# Patient Record
Sex: FEMALE | Race: BLACK OR AFRICAN AMERICAN | NOT HISPANIC OR LATINO | Employment: FULL TIME | URBAN - METROPOLITAN AREA
[De-identification: names, ages, dates, MRNs, and addresses within clinical notes are randomized per-mention and may not be internally consistent; named-entity substitution may affect disease eponyms.]

---

## 2022-04-10 ENCOUNTER — HISTORICAL (OUTPATIENT)
Dept: ADMINISTRATIVE | Facility: HOSPITAL | Age: 27
End: 2022-04-10

## 2022-04-29 VITALS
WEIGHT: 255.75 LBS | BODY MASS INDEX: 38.76 KG/M2 | SYSTOLIC BLOOD PRESSURE: 154 MMHG | HEIGHT: 68 IN | DIASTOLIC BLOOD PRESSURE: 109 MMHG | OXYGEN SATURATION: 100 %

## 2023-11-10 ENCOUNTER — HOSPITAL ENCOUNTER (INPATIENT)
Facility: HOSPITAL | Age: 28
LOS: 10 days | Discharge: LEFT BEFORE DISCHARGE | DRG: 062 | End: 2023-11-20
Attending: EMERGENCY MEDICINE | Admitting: INTERNAL MEDICINE
Payer: MEDICAID

## 2023-11-10 DIAGNOSIS — I63.9 STROKE: ICD-10-CM

## 2023-11-10 DIAGNOSIS — E87.6 HYPOKALEMIA: ICD-10-CM

## 2023-11-10 DIAGNOSIS — I63.89 CEREBROVASCULAR ACCIDENT (CVA) DUE TO OTHER MECHANISM: Primary | ICD-10-CM

## 2023-11-10 DIAGNOSIS — R29.818 ACUTE FOCAL NEUROLOGICAL DEFICIT: ICD-10-CM

## 2023-11-10 DIAGNOSIS — I16.1 HYPERTENSIVE EMERGENCY: ICD-10-CM

## 2023-11-10 LAB
ALBUMIN SERPL-MCNC: 3.1 G/DL (ref 3.5–5)
ALBUMIN/GLOB SERPL: 1.1 RATIO (ref 1.1–2)
ALP SERPL-CCNC: 68 UNIT/L (ref 40–150)
ALT SERPL-CCNC: 16 UNIT/L (ref 0–55)
ANION GAP SERPL CALC-SCNC: 20 MMOL/L (ref 8–16)
AST SERPL-CCNC: 14 UNIT/L (ref 5–34)
BASOPHILS # BLD AUTO: 0.01 X10(3)/MCL
BASOPHILS NFR BLD AUTO: 0.2 %
BILIRUB SERPL-MCNC: 0.3 MG/DL
BUN SERPL-MCNC: 11 MG/DL (ref 6–30)
BUN SERPL-MCNC: 12.2 MG/DL (ref 7–18.7)
CALCIUM SERPL-MCNC: 7.9 MG/DL (ref 8.4–10.2)
CHLORIDE SERPL-SCNC: 108 MMOL/L (ref 95–110)
CHLORIDE SERPL-SCNC: 113 MMOL/L (ref 98–107)
CHOLEST SERPL-MCNC: 101 MG/DL
CHOLEST/HDLC SERPL: 3 {RATIO} (ref 0–5)
CO2 SERPL-SCNC: 21 MMOL/L (ref 22–29)
CREAT SERPL-MCNC: 0.7 MG/DL (ref 0.5–1.4)
CREAT SERPL-MCNC: 0.73 MG/DL (ref 0.55–1.02)
EOSINOPHIL # BLD AUTO: 0.2 X10(3)/MCL (ref 0–0.9)
EOSINOPHIL NFR BLD AUTO: 3.2 %
ERYTHROCYTE [DISTWIDTH] IN BLOOD BY AUTOMATED COUNT: 15.1 % (ref 11.5–17)
GFR SERPLBLD CREATININE-BSD FMLA CKD-EPI: >60 MLS/MIN/1.73/M2
GLOBULIN SER-MCNC: 2.9 GM/DL (ref 2.4–3.5)
GLUCOSE SERPL-MCNC: 82 MG/DL (ref 74–100)
GLUCOSE SERPL-MCNC: 85 MG/DL (ref 70–110)
HCT VFR BLD AUTO: 27.3 % (ref 37–47)
HCT VFR BLD CALC: 29 %PCV (ref 36–54)
HDLC SERPL-MCNC: 33 MG/DL (ref 35–60)
HGB BLD-MCNC: 10 G/DL
HGB BLD-MCNC: 8.7 G/DL (ref 12–16)
IMM GRANULOCYTES # BLD AUTO: 0.02 X10(3)/MCL (ref 0–0.04)
IMM GRANULOCYTES NFR BLD AUTO: 0.3 %
INR PPP: 1.2
LDLC SERPL CALC-MCNC: 52 MG/DL (ref 50–140)
LYMPHOCYTES # BLD AUTO: 2.2 X10(3)/MCL (ref 0.6–4.6)
LYMPHOCYTES NFR BLD AUTO: 35 %
MAGNESIUM SERPL-MCNC: 1.6 MG/DL (ref 1.6–2.6)
MCH RBC QN AUTO: 24.6 PG (ref 27–31)
MCHC RBC AUTO-ENTMCNC: 31.9 G/DL (ref 33–36)
MCV RBC AUTO: 77.1 FL (ref 80–94)
MONOCYTES # BLD AUTO: 0.4 X10(3)/MCL (ref 0.1–1.3)
MONOCYTES NFR BLD AUTO: 6.4 %
NEUTROPHILS # BLD AUTO: 3.45 X10(3)/MCL (ref 2.1–9.2)
NEUTROPHILS NFR BLD AUTO: 54.9 %
NRBC BLD AUTO-RTO: 0 %
PLATELET # BLD AUTO: 212 X10(3)/MCL (ref 130–400)
PMV BLD AUTO: 10.1 FL (ref 7.4–10.4)
POC IONIZED CALCIUM: 1.13 MMOL/L (ref 1.06–1.42)
POC PTINR: 1.2 (ref 0.9–1.2)
POC PTWBT: 14.5 SEC (ref 9.7–14.3)
POC TCO2 (MEASURED): 20 MMOL/L (ref 23–27)
POTASSIUM BLD-SCNC: 2.5 MMOL/L (ref 3.5–5.1)
POTASSIUM SERPL-SCNC: 2.6 MMOL/L (ref 3.5–5.1)
PROT SERPL-MCNC: 6 GM/DL (ref 6.4–8.3)
PROTHROMBIN TIME: 14.6 SECONDS (ref 12.5–14.5)
RBC # BLD AUTO: 3.54 X10(6)/MCL (ref 4.2–5.4)
SAMPLE: ABNORMAL
SAMPLE: ABNORMAL
SODIUM BLD-SCNC: 144 MMOL/L (ref 136–145)
SODIUM SERPL-SCNC: 141 MMOL/L (ref 136–145)
TRIGL SERPL-MCNC: 80 MG/DL (ref 37–140)
TSH SERPL-ACNC: 0.61 UIU/ML (ref 0.35–4.94)
VLDLC SERPL CALC-MCNC: 16 MG/DL
WBC # SPEC AUTO: 6.28 X10(3)/MCL (ref 4.5–11.5)

## 2023-11-10 PROCEDURE — A4216 STERILE WATER/SALINE, 10 ML: HCPCS | Performed by: EMERGENCY MEDICINE

## 2023-11-10 PROCEDURE — 80061 LIPID PANEL: CPT | Performed by: EMERGENCY MEDICINE

## 2023-11-10 PROCEDURE — 80053 COMPREHEN METABOLIC PANEL: CPT | Performed by: EMERGENCY MEDICINE

## 2023-11-10 PROCEDURE — 96375 TX/PRO/DX INJ NEW DRUG ADDON: CPT

## 2023-11-10 PROCEDURE — 80047 BASIC METABLC PNL IONIZED CA: CPT

## 2023-11-10 PROCEDURE — 83735 ASSAY OF MAGNESIUM: CPT | Performed by: EMERGENCY MEDICINE

## 2023-11-10 PROCEDURE — 25000003 PHARM REV CODE 250

## 2023-11-10 PROCEDURE — 93005 ELECTROCARDIOGRAM TRACING: CPT

## 2023-11-10 PROCEDURE — 99291 CRITICAL CARE FIRST HOUR: CPT

## 2023-11-10 PROCEDURE — 85610 PROTHROMBIN TIME: CPT | Performed by: EMERGENCY MEDICINE

## 2023-11-10 PROCEDURE — 96365 THER/PROPH/DIAG IV INF INIT: CPT

## 2023-11-10 PROCEDURE — 93010 ELECTROCARDIOGRAM REPORT: CPT | Mod: ,,, | Performed by: INTERNAL MEDICINE

## 2023-11-10 PROCEDURE — 63600175 PHARM REV CODE 636 W HCPCS: Performed by: EMERGENCY MEDICINE

## 2023-11-10 PROCEDURE — 25500020 PHARM REV CODE 255: Performed by: EMERGENCY MEDICINE

## 2023-11-10 PROCEDURE — 11000001 HC ACUTE MED/SURG PRIVATE ROOM

## 2023-11-10 PROCEDURE — 93010 EKG 12-LEAD: ICD-10-PCS | Mod: ,,, | Performed by: INTERNAL MEDICINE

## 2023-11-10 PROCEDURE — 84443 ASSAY THYROID STIM HORMONE: CPT | Performed by: EMERGENCY MEDICINE

## 2023-11-10 PROCEDURE — 85025 COMPLETE CBC W/AUTO DIFF WBC: CPT | Performed by: EMERGENCY MEDICINE

## 2023-11-10 PROCEDURE — 25000003 PHARM REV CODE 250: Performed by: EMERGENCY MEDICINE

## 2023-11-10 RX ORDER — NICARDIPINE HYDROCHLORIDE 0.2 MG/ML
0-15 INJECTION INTRAVENOUS CONTINUOUS
Status: DISCONTINUED | OUTPATIENT
Start: 2023-11-10 | End: 2023-11-12

## 2023-11-10 RX ORDER — NICARDIPINE HYDROCHLORIDE 0.2 MG/ML
INJECTION INTRAVENOUS
Status: COMPLETED
Start: 2023-11-10 | End: 2023-11-10

## 2023-11-10 RX ORDER — POTASSIUM CHLORIDE 14.9 MG/ML
40 INJECTION INTRAVENOUS
Status: COMPLETED | OUTPATIENT
Start: 2023-11-10 | End: 2023-11-11

## 2023-11-10 RX ORDER — SODIUM CHLORIDE 0.9 % (FLUSH) 0.9 %
10 SYRINGE (ML) INJECTION ONCE
Status: COMPLETED | OUTPATIENT
Start: 2023-11-10 | End: 2023-11-10

## 2023-11-10 RX ADMIN — NICARDIPINE HYDROCHLORIDE 2.5 MG/HR: 0.2 INJECTION, SOLUTION INTRAVENOUS at 09:11

## 2023-11-10 RX ADMIN — Medication 25 MG: at 10:11

## 2023-11-10 RX ADMIN — POTASSIUM CHLORIDE 40 MEQ: 14.9 INJECTION, SOLUTION INTRAVENOUS at 11:11

## 2023-11-10 RX ADMIN — SODIUM CHLORIDE, PRESERVATIVE FREE 10 ML: 5 INJECTION INTRAVENOUS at 10:11

## 2023-11-10 RX ADMIN — IOPAMIDOL 50 ML: 755 INJECTION, SOLUTION INTRAVENOUS at 09:11

## 2023-11-10 NOTE — Clinical Note
Diagnosis: Acute focal neurological deficit [558691]   Admitting Provider:: ROSANA MICHAELS [[id number]]   Admit to which facility:: [facility] [[id number]]   Reason for IP Medical Treatment  (Clinical interventions that can only be accomplished in the IP setting? ) :: cva   I certify that Inpatient services for greater than or equal to 2 midnights are medically necessary:: Yes   Plans for Post-Acute care--if anticipated (pick the single best option):: A. No post acute care anticipated at this time

## 2023-11-11 PROBLEM — I63.9 STROKE: Status: ACTIVE | Noted: 2023-11-11

## 2023-11-11 LAB
ALBUMIN SERPL-MCNC: 3.6 G/DL (ref 3.5–5)
ALBUMIN/GLOB SERPL: 0.9 RATIO (ref 1.1–2)
ALP SERPL-CCNC: 72 UNIT/L (ref 40–150)
ALT SERPL-CCNC: 16 UNIT/L (ref 0–55)
AST SERPL-CCNC: 19 UNIT/L (ref 5–34)
AV INDEX (PROSTH): 0.8
AV MEAN GRADIENT: 3 MMHG
AV PEAK GRADIENT: 6 MMHG
AV VALVE AREA BY VELOCITY RATIO: 4.06 CM²
AV VALVE AREA: 3.91 CM²
AV VELOCITY RATIO: 0.83
BASOPHILS # BLD AUTO: 0.02 X10(3)/MCL
BASOPHILS NFR BLD AUTO: 0.3 %
BILIRUB SERPL-MCNC: 0.5 MG/DL
BSA FOR ECHO PROCEDURE: 2.29 M2
BUN SERPL-MCNC: 10.4 MG/DL (ref 7–18.7)
CALCIUM SERPL-MCNC: 8.9 MG/DL (ref 8.4–10.2)
CHLORIDE SERPL-SCNC: 108 MMOL/L (ref 98–107)
CO2 SERPL-SCNC: 21 MMOL/L (ref 22–29)
CREAT SERPL-MCNC: 0.76 MG/DL (ref 0.55–1.02)
CRP SERPL HS-MCNC: 9.46 MG/L
CV ECHO LV RWT: 0.52 CM
DOP CALC AO PEAK VEL: 1.27 M/S
DOP CALC AO VTI: 23.7 CM
DOP CALC LVOT AREA: 4.9 CM2
DOP CALC LVOT DIAMETER: 2.5 CM
DOP CALC LVOT PEAK VEL: 1.05 M/S
DOP CALC LVOT STROKE VOLUME: 92.73 CM3
DOP CALC MV VTI: 26.7 CM
DOP CALCLVOT PEAK VEL VTI: 18.9 CM
E WAVE DECELERATION TIME: 195 MSEC
E/A RATIO: 1.03
E/E' RATIO: 8.63 M/S
ECHO LV POSTERIOR WALL: 1.1 CM (ref 0.6–1.1)
EOSINOPHIL # BLD AUTO: 0.26 X10(3)/MCL (ref 0–0.9)
EOSINOPHIL NFR BLD AUTO: 3.7 %
ERYTHROCYTE [DISTWIDTH] IN BLOOD BY AUTOMATED COUNT: 15.1 % (ref 11.5–17)
ERYTHROCYTE [SEDIMENTATION RATE] IN BLOOD: 57 MM/HR (ref 0–20)
EST. AVERAGE GLUCOSE BLD GHB EST-MCNC: 102.5 MG/DL
FRACTIONAL SHORTENING: 31 % (ref 28–44)
GFR SERPLBLD CREATININE-BSD FMLA CKD-EPI: >60 MLS/MIN/1.73/M2
GLOBULIN SER-MCNC: 4.2 GM/DL (ref 2.4–3.5)
GLUCOSE SERPL-MCNC: 84 MG/DL (ref 74–100)
HBA1C MFR BLD: 5.2 %
HCT VFR BLD AUTO: 33.7 % (ref 37–47)
HGB BLD-MCNC: 10.9 G/DL (ref 12–16)
IMM GRANULOCYTES # BLD AUTO: 0.02 X10(3)/MCL (ref 0–0.04)
IMM GRANULOCYTES NFR BLD AUTO: 0.3 %
INTERVENTRICULAR SEPTUM: 1.5 CM (ref 0.6–1.1)
LEFT ATRIUM SIZE: 3.6 CM
LEFT INTERNAL DIMENSION IN SYSTOLE: 2.9 CM (ref 2.1–4)
LEFT VENTRICLE DIASTOLIC VOLUME INDEX: 35.73 ML/M2
LEFT VENTRICLE DIASTOLIC VOLUME: 78.6 ML
LEFT VENTRICLE MASS INDEX: 91 G/M2
LEFT VENTRICLE SYSTOLIC VOLUME INDEX: 14.6 ML/M2
LEFT VENTRICLE SYSTOLIC VOLUME: 32.2 ML
LEFT VENTRICULAR INTERNAL DIMENSION IN DIASTOLE: 4.2 CM (ref 3.5–6)
LEFT VENTRICULAR MASS: 200.57 G
LV LATERAL E/E' RATIO: 7.45 M/S
LV SEPTAL E/E' RATIO: 10.25 M/S
LVOT MG: 2 MMHG
LVOT MV: 0.67 CM/S
LYMPHOCYTES # BLD AUTO: 2.18 X10(3)/MCL (ref 0.6–4.6)
LYMPHOCYTES NFR BLD AUTO: 31.2 %
MCH RBC QN AUTO: 24.9 PG (ref 27–31)
MCHC RBC AUTO-ENTMCNC: 32.3 G/DL (ref 33–36)
MCV RBC AUTO: 76.9 FL (ref 80–94)
MONOCYTES # BLD AUTO: 0.47 X10(3)/MCL (ref 0.1–1.3)
MONOCYTES NFR BLD AUTO: 6.7 %
MV MEAN GRADIENT: 3 MMHG
MV PEAK A VEL: 0.8 M/S
MV PEAK E VEL: 0.82 M/S
MV PEAK GRADIENT: 5 MMHG
MV STENOSIS PRESSURE HALF TIME: 49 MS
MV VALVE AREA BY CONTINUITY EQUATION: 3.47 CM2
MV VALVE AREA P 1/2 METHOD: 4.49 CM2
NEUTROPHILS # BLD AUTO: 4.04 X10(3)/MCL (ref 2.1–9.2)
NEUTROPHILS NFR BLD AUTO: 57.8 %
NRBC BLD AUTO-RTO: 0 %
OHS LV EJECTION FRACTION SIMPSONS BIPLANE MOD: 51 %
PISA TR MAX VEL: 1.51 M/S
PLATELET # BLD AUTO: 227 X10(3)/MCL (ref 130–400)
PLATELETS.RETICULATED NFR BLD AUTO: 2.5 % (ref 0.9–11.2)
PMV BLD AUTO: 10.5 FL (ref 7.4–10.4)
POTASSIUM SERPL-SCNC: 3.8 MMOL/L (ref 3.5–5.1)
PROT SERPL-MCNC: 7.8 GM/DL (ref 6.4–8.3)
PV PEAK GRADIENT: 3 MMHG
PV PEAK VELOCITY: 0.87 M/S
RBC # BLD AUTO: 4.38 X10(6)/MCL (ref 4.2–5.4)
RIGHT VENTRICULAR END-DIASTOLIC DIMENSION: 3 CM
SODIUM SERPL-SCNC: 139 MMOL/L (ref 136–145)
TDI LATERAL: 0.11 M/S
TDI SEPTAL: 0.08 M/S
TDI: 0.1 M/S
TR MAX PG: 9 MMHG
TRICUSPID ANNULAR PLANE SYSTOLIC EXCURSION: 2.01 CM
WBC # SPEC AUTO: 6.99 X10(3)/MCL (ref 4.5–11.5)
Z-SCORE OF LEFT VENTRICULAR DIMENSION IN END DIASTOLE: -5.81
Z-SCORE OF LEFT VENTRICULAR DIMENSION IN END SYSTOLE: -3.59

## 2023-11-11 PROCEDURE — 25000003 PHARM REV CODE 250: Performed by: INTERNAL MEDICINE

## 2023-11-11 PROCEDURE — 25000003 PHARM REV CODE 250: Performed by: NURSE PRACTITIONER

## 2023-11-11 PROCEDURE — 20000000 HC ICU ROOM

## 2023-11-11 PROCEDURE — 25000003 PHARM REV CODE 250: Performed by: EMERGENCY MEDICINE

## 2023-11-11 PROCEDURE — 25000003 PHARM REV CODE 250: Performed by: STUDENT IN AN ORGANIZED HEALTH CARE EDUCATION/TRAINING PROGRAM

## 2023-11-11 PROCEDURE — 85025 COMPLETE CBC W/AUTO DIFF WBC: CPT | Performed by: STUDENT IN AN ORGANIZED HEALTH CARE EDUCATION/TRAINING PROGRAM

## 2023-11-11 PROCEDURE — 99223 1ST HOSP IP/OBS HIGH 75: CPT | Mod: ,,, | Performed by: PSYCHIATRY & NEUROLOGY

## 2023-11-11 PROCEDURE — 99223 PR INITIAL HOSPITAL CARE,LEVL III: ICD-10-PCS | Mod: ,,, | Performed by: PSYCHIATRY & NEUROLOGY

## 2023-11-11 PROCEDURE — 86141 C-REACTIVE PROTEIN HS: CPT | Performed by: NURSE PRACTITIONER

## 2023-11-11 PROCEDURE — 83036 HEMOGLOBIN GLYCOSYLATED A1C: CPT | Performed by: STUDENT IN AN ORGANIZED HEALTH CARE EDUCATION/TRAINING PROGRAM

## 2023-11-11 PROCEDURE — 85652 RBC SED RATE AUTOMATED: CPT | Performed by: NURSE PRACTITIONER

## 2023-11-11 PROCEDURE — 80053 COMPREHEN METABOLIC PANEL: CPT | Performed by: STUDENT IN AN ORGANIZED HEALTH CARE EDUCATION/TRAINING PROGRAM

## 2023-11-11 PROCEDURE — 92523 SPEECH SOUND LANG COMPREHEN: CPT

## 2023-11-11 RX ORDER — SODIUM CHLORIDE 9 MG/ML
INJECTION, SOLUTION INTRAVENOUS CONTINUOUS
Status: DISCONTINUED | OUTPATIENT
Start: 2023-11-11 | End: 2023-11-12

## 2023-11-11 RX ORDER — ACETAMINOPHEN 325 MG/1
650 TABLET ORAL EVERY 6 HOURS PRN
Status: DISCONTINUED | OUTPATIENT
Start: 2023-11-11 | End: 2023-11-20 | Stop reason: HOSPADM

## 2023-11-11 RX ORDER — LABETALOL HYDROCHLORIDE 5 MG/ML
10 INJECTION, SOLUTION INTRAVENOUS EVERY 4 HOURS PRN
Status: DISCONTINUED | OUTPATIENT
Start: 2023-11-11 | End: 2023-11-20 | Stop reason: HOSPADM

## 2023-11-11 RX ORDER — AMLODIPINE BESYLATE 5 MG/1
10 TABLET ORAL DAILY
Status: DISCONTINUED | OUTPATIENT
Start: 2023-11-11 | End: 2023-11-20 | Stop reason: HOSPADM

## 2023-11-11 RX ORDER — ACETAMINOPHEN 325 MG/1
650 TABLET ORAL ONCE
Status: COMPLETED | OUTPATIENT
Start: 2023-11-11 | End: 2023-11-11

## 2023-11-11 RX ORDER — MUPIROCIN 20 MG/G
OINTMENT TOPICAL 2 TIMES DAILY
Status: DISPENSED | OUTPATIENT
Start: 2023-11-11 | End: 2023-11-16

## 2023-11-11 RX ORDER — LABETALOL 100 MG/1
100 TABLET, FILM COATED ORAL EVERY 12 HOURS
Status: DISCONTINUED | OUTPATIENT
Start: 2023-11-11 | End: 2023-11-20 | Stop reason: HOSPADM

## 2023-11-11 RX ADMIN — MUPIROCIN: 20 OINTMENT TOPICAL at 08:11

## 2023-11-11 RX ADMIN — NICARDIPINE HYDROCHLORIDE 5 MG/HR: 0.2 INJECTION, SOLUTION INTRAVENOUS at 08:11

## 2023-11-11 RX ADMIN — SODIUM CHLORIDE: 9 INJECTION, SOLUTION INTRAVENOUS at 08:11

## 2023-11-11 RX ADMIN — AMLODIPINE BESYLATE 10 MG: 5 TABLET ORAL at 08:11

## 2023-11-11 RX ADMIN — MUPIROCIN: 20 OINTMENT TOPICAL at 09:11

## 2023-11-11 RX ADMIN — LABETALOL HYDROCHLORIDE 100 MG: 100 TABLET, FILM COATED ORAL at 09:11

## 2023-11-11 RX ADMIN — ACETAMINOPHEN 650 MG: 325 TABLET, FILM COATED ORAL at 11:11

## 2023-11-11 RX ADMIN — LABETALOL HYDROCHLORIDE 100 MG: 100 TABLET, FILM COATED ORAL at 08:11

## 2023-11-11 RX ADMIN — ACETAMINOPHEN 325MG 650 MG: 325 TABLET ORAL at 06:11

## 2023-11-11 RX ADMIN — POTASSIUM BICARBONATE 20 MEQ: 391 TABLET, EFFERVESCENT ORAL at 12:11

## 2023-11-11 NOTE — SUBJECTIVE & OBJECTIVE
History reviewed. No pertinent past medical history.    History reviewed. No pertinent surgical history.    Review of patient's allergies indicates:  Not on File      No current facility-administered medications on file prior to encounter.     No current outpatient medications on file prior to encounter.     Family History    None       Tobacco Use    Smoking status: Not on file    Smokeless tobacco: Not on file   Substance and Sexual Activity    Alcohol use: Not on file    Drug use: Not on file    Sexual activity: Not on file     Review of Systems   Neurological:  Positive for speech difficulty, weakness and headaches. Negative for dizziness, tremors, seizures, syncope, facial asymmetry, light-headedness and numbness.   All other systems reviewed and are negative.    Objective:     Vital Signs (Most Recent):  Temp: 98.4 °F (36.9 °C) (11/11/23 0215)  Pulse: 87 (11/11/23 0500)  Resp: 16 (11/11/23 0500)  BP: (!) 140/89 (11/11/23 0530)  SpO2: 96 % (11/11/23 0500) Vital Signs (24h Range):  Temp:  [98.1 °F (36.7 °C)-98.4 °F (36.9 °C)] 98.4 °F (36.9 °C)  Pulse:  [] 87  Resp:  [14-28] 16  SpO2:  [95 %-100 %] 96 %  BP: (127-198)/() 140/89     Weight: 111.1 kg (244 lb 14.9 oz)  Body mass index is 38.36 kg/m².     Physical Exam  Vitals reviewed.   Constitutional:       General: She is not in acute distress.     Appearance: She is not ill-appearing.   HENT:      Head: Normocephalic.   Eyes:      General: Vision grossly intact. No visual field deficit.     Extraocular Movements: Extraocular movements intact.      Pupils: Pupils are equal, round, and reactive to light.   Cardiovascular:      Rate and Rhythm: Normal rate.   Pulmonary:      Effort: Pulmonary effort is normal.   Skin:     General: Skin is warm and dry.      Capillary Refill: Capillary refill takes less than 2 seconds.   Neurological:      Mental Status: She is oriented to person, place, and time.      Cranial Nerves: No dysarthria or facial asymmetry.  "     Sensory: Sensation is intact.      Motor: No weakness or pronator drift.      Coordination: Coordination is intact.   Psychiatric:         Attention and Perception: Attention normal.         Mood and Affect: Mood and affect normal.         Speech: Speech normal.         Behavior: Behavior normal.        Significant Labs: Hemoglobin A1c:   Recent Labs   Lab 11/11/23  0613   HGBA1C 5.2     BMP:   Recent Labs   Lab 11/10/23  2135 11/11/23  0405    139   K 2.6* 3.8   CO2 21* 21*   BUN 12.2 10.4   CREATININE 0.73 0.76   CALCIUM 7.9* 8.9   MG 1.60  --      CBC:   Recent Labs   Lab 11/10/23  2132 11/10/23  2135 11/11/23  0405   WBC  --  6.28 6.99   HGB  --  8.7* 10.9*   HCT 29* 27.3* 33.7*   PLT  --  212 227     Inflammatory Markers: No results for input(s): "SEDRATE", "CRP", "PROCAL" in the last 48 hours.    Significant Imaging: I have reviewed all pertinent imaging results/findings within the past 24 hours.    "

## 2023-11-11 NOTE — PT/OT/SLP PROGRESS
Physical Therapy      Patient Name:  Trish Hale   MRN:  [id number]    Patient not seen today secondary to on 24 hour bedrest s/p TNK on 11/10 at 10:15 p.m. Will follow-up as schedule permits after bedrest is completed.

## 2023-11-11 NOTE — NURSING
Nurses Note -- 4 Eyes      11/11/2023   11:20 AM      Skin assessed during: Admit      [x] No Altered Skin Integrity Present    [x]Prevention Measures Documented      [] Yes- Altered Skin Integrity Present or Discovered   [] LDA Added if Not in Epic (Describe Wound)   [] New Altered Skin Integrity was Present on Admit and Documented in LDA   [] Wound Image Taken    Wound Care Consulted? No    Attending Nurse:  Huma Nguyen RN/Staff Member:  Juvenal Lamb RN

## 2023-11-11 NOTE — PT/OT/SLP PROGRESS
Occupational Therapy      Patient Name:  Trish Hale   MRN:  [id number]    OT consult received, however pt is s/p TNK on 11/10 at 10:15 p.m and is on 24 hr bedrest from that time per neuro recs. OT to f/u for initial eval as appropriate.    11/11/2023

## 2023-11-11 NOTE — CONSULTS
[facility] - 7th Floor ICU  Neurology  Consult Note    Patient Name: Trish Hale  MRN: [id number]  Admission Date: 11/10/2023  Hospital Length of Stay: 1 days  Code Status: No Order   Attending Provider: ROSANA Lugo MD   Consulting Provider: SASHA Gutierrez  Primary Care Physician: No, Primary Doctor  Principal Problem:<principal problem not specified>    Inpatient consult to Vascular (Stroke) Neurology  Consult performed by: Bri Perez FNP  Consult ordered by: Glenys Lenz MD         Subjective:     Chief Complaint:    Chief Complaint   Patient presents with    Cerebrovascular Accident     Pt presents with slurred speech, aphasia, R arm droop R weakness that started at 1830. [id number]         HPI:   28-year-old female with past medical history of HTN, presented to ED on 11/10 for reports of slurred speech and right-sided weakness.  Symptoms began at 6:30 p.m..  She reported similar episode in July of 2023 that resolved without intervention.  CTH showed no acute intracranial abnormality.  She was hypertensive in ED and started on Cardene infusion.  She was given TNK at 10:15 p.m..  She was admitted to ICU for post TNK protocol.  Neurology is following for stroke workup.       History reviewed. No pertinent past medical history.    History reviewed. No pertinent surgical history.    Review of patient's allergies indicates:  Not on File      No current facility-administered medications on file prior to encounter.     No current outpatient medications on file prior to encounter.     Family History    None       Tobacco Use    Smoking status: Not on file    Smokeless tobacco: Not on file   Substance and Sexual Activity    Alcohol use: Not on file    Drug use: Not on file    Sexual activity: Not on file     Review of Systems   Neurological:  Positive for speech difficulty, weakness and headaches. Negative for dizziness, tremors, seizures, syncope, facial asymmetry,  light-headedness and numbness.   All other systems reviewed and are negative.    Objective:     Vital Signs (Most Recent):  Temp: 98.4 °F (36.9 °C) (11/11/23 0215)  Pulse: 87 (11/11/23 0500)  Resp: 16 (11/11/23 0500)  BP: (!) 140/89 (11/11/23 0530)  SpO2: 96 % (11/11/23 0500) Vital Signs (24h Range):  Temp:  [98.1 °F (36.7 °C)-98.4 °F (36.9 °C)] 98.4 °F (36.9 °C)  Pulse:  [] 87  Resp:  [14-28] 16  SpO2:  [95 %-100 %] 96 %  BP: (127-198)/() 140/89     Weight: 111.1 kg (244 lb 14.9 oz)  Body mass index is 38.36 kg/m².     Physical Exam  Vitals reviewed.   Constitutional:       General: She is not in acute distress.     Appearance: She is not ill-appearing.   HENT:      Head: Normocephalic.   Eyes:      General: Vision grossly intact. No visual field deficit.     Extraocular Movements: Extraocular movements intact.      Pupils: Pupils are equal, round, and reactive to light.   Cardiovascular:      Rate and Rhythm: Normal rate.   Pulmonary:      Effort: Pulmonary effort is normal.   Skin:     General: Skin is warm and dry.      Capillary Refill: Capillary refill takes less than 2 seconds.   Neurological:      Mental Status: She is oriented to person, place, and time.      Cranial Nerves: No dysarthria or facial asymmetry.      Sensory: Sensation is intact.      Motor: No weakness or pronator drift.      Coordination: Coordination is intact.   Psychiatric:         Attention and Perception: Attention normal.         Mood and Affect: Mood and affect normal.         Speech: Speech normal.         Behavior: Behavior normal.        Significant Labs: Hemoglobin A1c:   Recent Labs   Lab 11/11/23  0613   HGBA1C 5.2     BMP:   Recent Labs   Lab 11/10/23  2135 11/11/23  0405    139   K 2.6* 3.8   CO2 21* 21*   BUN 12.2 10.4   CREATININE 0.73 0.76   CALCIUM 7.9* 8.9   MG 1.60  --      CBC:   Recent Labs   Lab 11/10/23  2132 11/10/23  2135 11/11/23  0405   WBC  --  6.28 6.99   HGB  --  8.7* 10.9*   HCT 29* 27.3*  "33.7*   PLT  --  212 227     Inflammatory Markers: No results for input(s): "SEDRATE", "CRP", "PROCAL" in the last 48 hours.    Significant Imaging: I have reviewed all pertinent imaging results/findings within the past 24 hours.    Assessment and Plan:     Stroke  Stroke workup  - presented with slurred speech and right-sided weakness  - Stroke RF:  HTN  - Intervention: s/p TNK on 11/10 at 10:15 p.m..  No LVO on CTA.    - Etiology:  TBD    Stroke workup:  -CTh:  No acute intracranial abnormality.  -CTA h/n:  No acute abnormality. No high-grade stenosis or major vessel occlusion.  -LDL: 52  -A1c:  5.2  -TSH:  0.612  -not on antiplatelet, anticoagulation, or statin therapy home      Plan:  -permissive HTN for now ... SBP less than 180  -q1hr neuro checks  -STAT CTh for any HA or neuro change  -HOB flat, Bedrest, NPO x24 hours post TNK  -repeat CTh after 24 hours to determine if antiplatelet therapy can be initiated         VTE Risk Mitigation (From admission, onward)      None            Thank you for your consult.  Further recommendations to follow by MD.     Bri Perez, DILIAP  Neurology  [facility] - 7th Floor ICU  "

## 2023-11-11 NOTE — HPI
28-year-old female with past medical history of HTN, presented to ED on 11/10 for reports of slurred speech and right-sided weakness.  Symptoms began at 6:30 p.m..  She reported similar episode in July of 2023 that resolved without intervention.  CTH showed no acute intracranial abnormality.  She was hypertensive in ED and started on Cardene infusion.  She was given TNK at 10:15 p.m..  She was admitted to ICU for post TNK protocol.  Neurology is following for stroke workup.

## 2023-11-11 NOTE — H&P
[facility] - Emergency Dept  Pulmonary Critical Care Note    Patient Name: Trish Hale  MRN: [id number]  Admission Date: 11/10/2023  Hospital Length of Stay: 1 days  Code Status: No Order  Attending Provider: ROSANA Lugo MD  Primary Care Provider: Angle, Primary Doctor     Subjective:     HPI:   Trish Hale is a 28 y.o. female with PMH of HTN, who presented to the ED on 11/10/2023 with CC of slurred speech and right-sided weakness accompanied by numbness that started at 1830 on 11/10/2023. She states that she had similar episode in July 2023 that resolved without any intervention. Questionable compliance with antihypertensive medications. Admitted to the ICU for close monitoring s/p TNK.     Hospital Course/Significant events:  11/10/2023 - Admitted to ICU s/p TNK    24 Hour Interval History:  Pending    History reviewed. No pertinent past medical history.    History reviewed. No pertinent surgical history.    Social History     Socioeconomic History    Marital status: Single       No current outpatient medications  Current Inpatient Medications   potassium chloride in water  40 mEq Intravenous ED 1 Time     Current Intravenous Infusions   nicardipine 8 mg/hr (11/10/23 2212)     Review of Systems   Neurological:  Positive for weakness.        Slurred speech        Objective:   No intake or output data in the 24 hours ending 11/11/23 0103  Vital Signs (Most Recent):  Temp: 98.1 °F (36.7 °C) (11/10/23 2126)  Pulse: 71 (11/10/23 2203)  Resp: 16 (11/10/23 2203)  BP: (!) 191/121 (11/10/23 2203)  SpO2: 100 % (11/10/23 2203)  Body mass index is 38.36 kg/m².  Weight: 111.1 kg (244 lb 14.9 oz) Vital Signs (24h Range):  Temp:  [98.1 °F (36.7 °C)] 98.1 °F (36.7 °C)  Pulse:  [71-82] 71  Resp:  [16-24] 16  SpO2:  [100 %] 100 %  BP: (188-198)/(120-128) 191/121     Physical Exam  General: Obese w/o distress  HEENT: NC/AT; PERRL; nasal and oral mucosa moist and clear  Pulm: CTA bilaterally, normal work of  "breathing on room air  CV: S1, S2 w/o murmurs or gallops; no edema noted  GI: Bowel sound present in all quadrants, abdomen soft to palpation  MSK: Limited ROM in all extremities d/t weakness   Neuro: AAOx2 (person and place); motor/sensory function intact    Lines/Drains/Airways       Peripheral Intravenous Line  Duration                  Peripheral IV - Single Lumen 11/10/23 2100 18 G Anterior;Left;Proximal Forearm <1 day         Peripheral IV - Single Lumen 11/10/23 2330 20 G Right Antecubital <1 day                  Significant Labs:  Lab Results   Component Value Date    WBC 6.28 11/10/2023    HGB 8.7 (L) 11/10/2023    HCT 27.3 (L) 11/10/2023    MCV 77.1 (L) 11/10/2023     11/10/2023       BMP  Lab Results   Component Value Date     11/10/2023    K 2.6 (LL) 11/10/2023    CHLORIDE 113 (H) 11/10/2023    CO2 21 (L) 11/10/2023    BUN 12.2 11/10/2023    CREATININE 0.73 11/10/2023    CALCIUM 7.9 (L) 11/10/2023     ABG  No results for input(s): "PH", "PO2", "PCO2", "HCO3", "BE" in the last 168 hours.  Mechanical Ventilation Support:       Significant Imaging:  I have reviewed the pertinent imaging within the past 24 hours.    Assessment/Plan:     Assessment  Acute CVA s/p TNK on 11/10/2023 at 2215  Hypertensive emergency     Plan  -Admitted to ICU for ongoing monitoring and medical management s/p TNK  -Will consult neurology and SLP in the morning for further assistance  -Holding antiplatelet and anticoagulation for 24 hours post-TNK   -Will keep SBP <180 mmHg and DBP <100; continue nicardipine drip; will plan to resume home Amlodipine 10mg daily and start labetalol 100mg Q12H when she passes swallowing test  -Will repeat CT head w/o contrast 24 hours post-TNK to r/o hemorrhagic conversion  -Pending: echo w/ bubble, brain MRI    DVT Prophylaxis: SCD  GI Prophylaxis: None     32 minutes of critical care was time spent personally by me on the following activities: development of treatment plan with patient " or surrogate and bedside caregivers, discussions with consultants, evaluation of patient's response to treatment, examination of patient, ordering and performing treatments and interventions, ordering and review of laboratory studies, ordering and review of radiographic studies, pulse oximetry, re-evaluation of patient's condition.  This critical care time did not overlap with that of any other provider or involve time for any procedures.     Candy John DO, PGY-II  Pulmonary Critical Care Medicine  [facility] - Emergency Dept

## 2023-11-11 NOTE — PLAN OF CARE
Problem: Adult Inpatient Plan of Care  Goal: Plan of Care Review  Outcome: Ongoing, Progressing  Goal: Patient-Specific Goal (Individualized)  Outcome: Ongoing, Progressing  Goal: Absence of Hospital-Acquired Illness or Injury  Outcome: Ongoing, Progressing  Goal: Optimal Comfort and Wellbeing  Outcome: Ongoing, Progressing  Goal: Readiness for Transition of Care  Outcome: Ongoing, Progressing     Problem: Skin Injury Risk Increased  Goal: Skin Health and Integrity  Outcome: Ongoing, Progressing     Problem: Adjustment to Illness (Stroke, Ischemic/Transient Ischemic Attack)  Goal: Optimal Coping  Outcome: Ongoing, Progressing     Problem: Bowel Elimination Impaired (Stroke, Ischemic/Transient Ischemic Attack)  Goal: Effective Bowel Elimination  Outcome: Ongoing, Progressing     Problem: Cerebral Tissue Perfusion (Stroke, Ischemic/Transient Ischemic Attack)  Goal: Optimal Cerebral Tissue Perfusion  Outcome: Ongoing, Progressing     Problem: Cognitive Impairment (Stroke, Ischemic/Transient Ischemic Attack)  Goal: Optimal Cognitive Function  Outcome: Ongoing, Progressing     Problem: Communication Impairment (Stroke, Ischemic/Transient Ischemic Attack)  Goal: Improved Communication Skills  Outcome: Ongoing, Progressing     Problem: Functional Ability Impaired (Stroke, Ischemic/Transient Ischemic Attack)  Goal: Optimal Functional Ability  Outcome: Ongoing, Progressing     Problem: Respiratory Compromise (Stroke, Ischemic/Transient Ischemic Attack)  Goal: Effective Oxygenation and Ventilation  Outcome: Ongoing, Progressing     Problem: Sensorimotor Impairment (Stroke, Ischemic/Transient Ischemic Attack)  Goal: Improved Sensorimotor Function  Outcome: Ongoing, Progressing     Problem: Swallowing Impairment (Stroke, Ischemic/Transient Ischemic Attack)  Goal: Optimal Eating and Swallowing without Aspiration  Outcome: Ongoing, Progressing     Problem: Urinary Elimination Impaired (Stroke, Ischemic/Transient Ischemic  Attack)  Goal: Effective Urinary Elimination  Outcome: Ongoing, Progressing

## 2023-11-11 NOTE — ED PROVIDER NOTES
Encounter Date: 11/10/2023    SCRIBE #1 NOTE: I, Cornelio Le, am scribing for, and in the presence of,  Glenys Lenz MD. I have scribed the following portions of the note - Other sections scribed: HPI, ROS, PE.       History     Chief Complaint   Patient presents with    Cerebrovascular Accident     Pt presents with slurred speech, aphasia, R arm droop R weakness that started at 1830. [id number]     27 y/o female with a history of HTN presents to the ED via EMS activated as a code FAST. Pt is complaining of slurred speech, right-sided weakness, and right-sided numbness that began at 18:30 today. She had a similar episode in July and was seen at [facility] at that time. Notes taht she did not take her amlodipine and hydrochlorothiazide today. She denies any recent injuries or trauma. CBG 83.    The history is provided by the patient and the EMS personnel. No  was used.     Review of patient's allergies indicates:  Not on File  History reviewed. No pertinent past medical history.  History reviewed. No pertinent surgical history.  History reviewed. No pertinent family history.     Review of Systems   Constitutional:  Negative for activity change, diaphoresis, fatigue and fever.   HENT:  Negative for congestion, postnasal drip, rhinorrhea, sinus pain, sneezing and sore throat.    Respiratory:  Negative for cough, chest tightness, shortness of breath and wheezing.    Cardiovascular:  Negative for chest pain, palpitations and leg swelling.   Gastrointestinal:  Negative for abdominal distention, abdominal pain and blood in stool.   Genitourinary:  Negative for decreased urine volume, difficulty urinating and dysuria.   Musculoskeletal: Negative.    Skin:  Negative for color change and pallor.   Neurological:  Positive for speech difficulty, weakness (right sided) and numbness (right sided). Negative for dizziness and light-headedness.   All other systems reviewed and are  negative.      Physical Exam     Initial Vitals [11/10/23 2126]   BP Pulse Resp Temp SpO2   (!) 198/128 82 20 98.1 °F (36.7 °C) 100 %      MAP       --         Physical Exam    Nursing note and vitals reviewed.  Constitutional: She appears well-developed and well-nourished. She is not diaphoretic. No distress.   HENT:   Head: Normocephalic and atraumatic.   Nose: Nose normal.   Mouth/Throat: Oropharynx is clear and moist.   Eyes: Conjunctivae and EOM are normal. Pupils are equal, round, and reactive to light.   Neck: Trachea normal. Neck supple.   Normal range of motion.  Cardiovascular:  Normal rate, regular rhythm, normal heart sounds and intact distal pulses.           No murmur heard.  Pulmonary/Chest: Breath sounds normal. No respiratory distress. She has no wheezes. She has no rhonchi. She has no rales. She exhibits no tenderness.   Abdominal: Abdomen is soft. Bowel sounds are normal. She exhibits no distension and no mass. There is no abdominal tenderness. There is no rebound and no guarding.   Musculoskeletal:         General: No tenderness or edema. Normal range of motion.      Cervical back: Normal range of motion and neck supple.      Lumbar back: Normal. Normal range of motion.     Neurological: She is alert and oriented to person, place, and time. A sensory deficit is present. No cranial nerve deficit.   Expressive aphasia  Weakness and decreased sensation to the right upper and lower extremities   Skin: Skin is warm and dry. Capillary refill takes less than 2 seconds. No abscess noted. No erythema. No pallor.         ED Course   Critical Care    Date/Time: 11/10/2023 11:01 PM    Performed by: Glenys Lenz MD  Authorized by: Glenys Lenz MD  Direct patient critical care time: 65 minutes  Total critical care time (exclusive of procedural time) : 65 minutes  Critical care was necessary to treat or prevent imminent or life-threatening deterioration of the following conditions: CNS failure or  compromise.  Critical care was time spent personally by me on the following activities: development of treatment plan with patient or surrogate, discussions with consultants, evaluation of patient's response to treatment, examination of patient, obtaining history from patient or surrogate, ordering and performing treatments and interventions, ordering and review of laboratory studies, ordering and review of radiographic studies, pulse oximetry, re-evaluation of patient's condition and review of old charts.        Labs Reviewed   COMPREHENSIVE METABOLIC PANEL - Abnormal; Notable for the following components:       Result Value    Potassium Level 2.6 (*)     Chloride 113 (*)     Carbon Dioxide 21 (*)     Calcium Level Total 7.9 (*)     Protein Total 6.0 (*)     Albumin Level 3.1 (*)     All other components within normal limits   PROTIME-INR - Abnormal; Notable for the following components:    PT 14.6 (*)     All other components within normal limits   LIPID PANEL - Abnormal; Notable for the following components:    HDL Cholesterol 33 (*)     All other components within normal limits   CBC WITH DIFFERENTIAL - Abnormal; Notable for the following components:    RBC 3.54 (*)     Hgb 8.7 (*)     Hct 27.3 (*)     MCV 77.1 (*)     MCH 24.6 (*)     MCHC 31.9 (*)     All other components within normal limits   ISTAT CHEM8 - Abnormal; Notable for the following components:    POC Potassium 2.5 (*)     POC TCO2 (MEASURED) 20 (*)     POC Anion Gap 20 (*)     POC Hematocrit 29 (*)     All other components within normal limits   ISTAT PROCEDURE - Abnormal; Notable for the following components:    POC PTWBT 14.5 (*)     All other components within normal limits   TSH - Normal   MAGNESIUM - Normal   CBC W/ AUTO DIFFERENTIAL    Narrative:     The following orders were created for panel order CBC W/ AUTO DIFFERENTIAL.  Procedure                               Abnormality         Status                     ---------                                -----------         ------                     CBC with Differential[[id number]]       Abnormal            Final result                 Please view results for these tests on the individual orders.   POCT GLUCOSE, HAND-HELD DEVICE     EKG Readings: (Independently Interpreted)   Rhythm: Normal Sinus Rhythm. Heart Rate: 90. Ectopy: No Ectopy. Conduction: Normal. ST Segments: Normal ST Segments. T Waves: Normal. Axis: Normal.   2259       Imaging Results              CTA STROKE MULTI-PHASE (Final result)  Result time 11/10/23 22:18:48      Final result by Tay Stark MD (11/10/23 22:18:48)                   Impression:      No acute abnormality. No high-grade stenosis or major vessel occlusion.      Electronically signed by: Tay Stark MD  Date:    11/10/2023  Time:    22:18               Narrative:    EXAMINATION:  CTA STROKE MULTI-PHASE    CLINICAL HISTORY:  Neuro deficit, acute, stroke suspected;    TECHNIQUE:  CT angiogram was performed from the level of the jenny to the top of the head following the IV administration of contrast.  Sagittal and coronal reconstructions and maximum intensity projection reconstructions were performed. Arterial stenosis percentages are based on NASCET measurement criteria.  Two additional phases of immediate post-contrast CTA images were performed through the head alone.    Total DLP: 2033 mGy.cm    Automatic exposure control was utilized to reduce the patient's dose    COMPARISON:  None    FINDINGS:  Intracranial Compartment:    Ventricles and sulci are normal in size for age without evidence of hydrocephalus. No extra-axial blood or fluid collections.    The brain parenchyma appears normal. No parenchymal mass, hemorrhage, edema, or major vascular distribution infarct.    Skull/Extracranial Contents (limited evaluation): No fracture. Mastoid air cells and paranasal sinuses are essentially clear.    Non-Vascular Structures of the Neck/Thoracic Inlet (limited  evaluation): Normal.    Aorta: Normal 3 vessel arch.    Extracranial carotid circulation: No hemodynamically significant stenosis, aneurysmal dilatation, or dissection.    Extracranial vertebral circulation: No hemodynamically significant stenosis, aneurysmal dilatation, or dissection.    Intracranial Arteries: No focal high-grade stenosis, occlusion, or aneurysm.    Venous structures (limited evaluation): Normal.                                       CT HEAD FOR STROKE (Final result)  Result time 11/10/23 22:03:02   Procedure changed from CT Head Without Contrast     Final result by Tay Stark MD (11/10/23 22:03:02)                   Impression:      No acute intracranial abnormality.      Electronically signed by: Tay Stark MD  Date:    11/10/2023  Time:    22:03               Narrative:    EXAMINATION:  CT of the head without contrast    CLINICAL HISTORY:  Neurological deficit, acute stroke suspected    TECHNIQUE:  Routine CT of the head was performed without contrast    Total DLP: 1050 mGy.cm    Automatic exposure control was utilized to reduce the patient's dose    COMPARISON:  None    FINDINGS:  There is no acute intracranial hemorrhage, midline shift, mass-effect, or extra-axial collection.  No sulcal effacement.  Gray-white matter differentiation is preserved.  The ventricles are normal in size and configuration.  Visualized osseous structures are intact.  The visualized paranasal sinuses and mastoid air cells are clear.  Left upper maxillary molar dentigerous cysts noted.                                       Medications   niCARdipine 40 mg/200 mL (0.2 mg/mL) infusion (8 mg/hr Intravenous Rate/Dose Change 11/10/23 2212)   potassium chloride 20 mEq in 100 mL IVPB (FOR CENTRAL LINE ADMINISTRATION ONLY) (has no administration in time range)   iopamidoL (ISOVUE-370) injection 100 mL (50 mLs Intravenous Given 11/10/23 2245)   tenecteplase (TNKase) IV KIT 25 mg (25 mg Intravenous Given 11/10/23 2215)      Followed by   sodium chloride 0.9% flush 10 mL (10 mLs Intravenous Given 11/10/23 2200)     Medical Decision Making  The differential diagnosis includes, but is not limited to: hemorrhagic CVA, ischemic CVA, TIA  Cbc, cmp, coags, lipid panel, ekg, ct head and cta ordered and reviewed  Severly hypertensive requiring iv antihypertensive infusion  Discussed with neurology who recommended thrombolytics as pt has no contraindication  Pt agrees, admit to icu  Lasb showing anemia that is microcytic, likely chronic and significnat hypokalemia, iv k ordered,     Problems Addressed:  Acute focal neurological deficit: acute illness or injury that poses a threat to life or bodily functions  Cerebrovascular accident (CVA) due to other mechanism: acute illness or injury that poses a threat to life or bodily functions  Hypertensive emergency: acute illness or injury that poses a threat to life or bodily functions  Hypokalemia: acute illness or injury that poses a threat to life or bodily functions    Amount and/or Complexity of Data Reviewed  Independent Historian: EMS  External Data Reviewed: notes.     Details: Recent visit at another afcility  Labs: ordered. Decision-making details documented in ED Course.  Radiology: ordered.  ECG/medicine tests: ordered and independent interpretation performed.    Risk  OTC drugs.  Prescription drug management.  Drug therapy requiring intensive monitoring for toxicity.  Decision regarding hospitalization.    Critical Care  Total time providing critical care: 65 minutes      Additional MDM:     NIH Stroke Scale:   Interval = baseline (upon arrival/admit)  Level of consciousness = 0 - alert  LOC questions = 0 - answers both correctly  LOC commands = 0 - performs both correctly  Best gaze = 0 - normal  Visual = 0 - no visual loss  Facial palsy = 0 - normal  Motor left arm =  0 - no drift  Motor right arm =  2 - can't resist gravity  Motor left leg = 0 - no drift  Motor right leg =  2 - can't  resist gravity  Limb ataxia = 1 - present in one limb  Sensory = 1 - mild to moderate loss  Best language = 1 - mild to moderate aphasia  Dysarthria = 1 - mild to moderate dysarthria  Extinction and inattention = 0 - no neglect  NIH Stroke Scale Total = 8           Scribe Attestation:   Scribe #1: I performed the above scribed service and the documentation accurately describes the services I performed. I attest to the accuracy of the note.  Comments: Attending:   Physician Attestation Statement for Scribe #1: IGlenys MD, personally performed the services described in this documentation. All medical record entries made by the scribe were at my direction and in my presence.  I have reviewed the chart and agree that the record reflects my personal performance and is accurate and complete.        Attending Attestation:           Physician Attestation for Scribe:  Physician Attestation Statement for Scribe #1: IGlenys MD, reviewed documentation, as scribed by Cornelio Le in my presence, and it is both accurate and complete.             ED Course as of 11/10/23 2302   Fri Nov 10, 2023   2148 Consulting stroke neurology [BS]   2155 Discussed with dr alvarado who reviewed cta and recommends proceeding with thrombolytics as long as ct read negative and bp controlled [BS]   2246 ICU paged [JG]   2256 LYMPH %: 35.0 [BS]      ED Course User Index  [BS] Glenys Lenz MD  [JG] Cornelio Le               Medical Decision Making:   History:   I obtained history from: EMS provider.  Old Medical Records: I decided to obtain old medical records.  Old Records Summarized: records from another hospital.  Initial Assessment:   See hpi  Independently Interpreted Test(s):   I have ordered and independently interpreted X-rays - see prior notes.  I have ordered and independently interpreted EKG Reading(s) - see prior notes  Clinical Tests:   Lab Tests: Ordered and Reviewed  Radiological Study: Ordered and  Reviewed  Medical Tests: Ordered and Reviewed  Other:   I have discussed this case with another health care provider.      Clinical Impression:   Final diagnoses:  [R29.818] Acute focal neurological deficit  [I63.89] Cerebrovascular accident (CVA) due to other mechanism (Primary)  [I16.1] Hypertensive emergency  [E87.6] Hypokalemia        ED Disposition Condition    Admit Stable                Glenys Lenz MD  11/10/23 1076

## 2023-11-11 NOTE — PT/OT/SLP EVAL
[facility]  Speech Language Pathology Department  Cognitive-Communication Evaluation    Patient Name:  Trish Hale   MRN:  [id number]    Recommendations:     General recommendations:  standardized cognitive testing  Communication strategies:  none    History:     Trish Hale is a/n 28 y.o. female admitted to ICU s/p tenecteplase administration after presenting with slurred speech, aphasia, and right sided weakness.  Pt passed nursing swallow screen.    Previous level of Function  Education:some college  Occupation:   Lives: with children (ages 10-1)  Handed: Right  Glasses: yes  Hearing Aids: no  Home Responsibilities: drives, financial management, medication/health management, parenting/care of others (4 children), laundry, shopping, meal preparation, and cleaning    Subjective     Patient awake, alert, and cooperative.    Patient goals: to go home     Spiritual/Cultural/Sabianism Beliefs/Practices that affect care: no  Pain/Comfort: Pain Rating 1: 0/10  Respiratory Status: room air    Objective:     ORAL MUSCULATURE  Dentition: own teeth  Facial Movement: WFL  Buccal Strength & Mobility: WFL  Mandibular Strength & Mobility: WFL  Oral Labial Strength & Mobility: WFL  Lingual Strength & Mobility: WFL  Velar Elevation: unable to assess    SPEECH PRODUCTION  Phoneme Production: adequate  Voice Quality: adequate  Voice Production: adequate  Speech Rate: slow  Loudness: reduced  Respiration: WFL for speech  Resonance: adequate  Prosody: adequate  Speech Intelligibility  Known Context: Greater that 90%  Unknown Context: Greater that 90%    AUDITORY COMPREHENSION  Following Directions:  1-Step: 100%  2-Step: 100%  Yes/No Questions:  Biographical: 100%  Environmental: 100%  Simple: 100%    VERBAL EXPRESSION  Automatic Speech:  Functional needs: within functional limits  Confrontation Naming  Objects: 100%  Wh- Questions:  Object name: 100%  Object function:  100%    COGNITION  Orientation:  Person: yes  Place: yes  Time: yes  Situation: yes   Attention:  Focused: within functional limits  Sustained: within functional limits  Pragmatics:  Within functional limits  Memory:  Long Term: within functional limits  Problem Solving  Functional simple: within functional limits    Assessment:     Pt presents with functional speech/language abilities.  Given high level of independent level of functioning and home responsibilities further assessment of cognitive skills warranted.    Patient Education:     Patient and parent/s were provided with verbal education regarding SLP POC.  Understanding was verbalized.    Plan:     SLP Follow-Up:  Yes   Plan of Care reviewed with:  patient      Time Tracking:     SLP Treatment Date:   11/11/23  Speech Start Time:  1250  Speech Stop Time:  1305     Speech Total Time (min):  15 min    Billable minutes:  Evaluation of Speech Sound Production with Comprehension and Expression, 15 minutes     11/11/2023

## 2023-11-12 PROCEDURE — 25000003 PHARM REV CODE 250: Performed by: STUDENT IN AN ORGANIZED HEALTH CARE EDUCATION/TRAINING PROGRAM

## 2023-11-12 PROCEDURE — 97535 SELF CARE MNGMENT TRAINING: CPT

## 2023-11-12 PROCEDURE — 97162 PT EVAL MOD COMPLEX 30 MIN: CPT

## 2023-11-12 PROCEDURE — 25000003 PHARM REV CODE 250: Performed by: NURSE PRACTITIONER

## 2023-11-12 PROCEDURE — 97166 OT EVAL MOD COMPLEX 45 MIN: CPT

## 2023-11-12 PROCEDURE — 25000003 PHARM REV CODE 250: Performed by: INTERNAL MEDICINE

## 2023-11-12 PROCEDURE — 99233 PR SUBSEQUENT HOSPITAL CARE,LEVL III: ICD-10-PCS | Mod: ,,, | Performed by: PSYCHIATRY & NEUROLOGY

## 2023-11-12 PROCEDURE — 99233 SBSQ HOSP IP/OBS HIGH 50: CPT | Mod: ,,, | Performed by: PSYCHIATRY & NEUROLOGY

## 2023-11-12 PROCEDURE — 21400001 HC TELEMETRY ROOM

## 2023-11-12 PROCEDURE — 94761 N-INVAS EAR/PLS OXIMETRY MLT: CPT

## 2023-11-12 RX ORDER — ASPIRIN 81 MG/1
81 TABLET ORAL DAILY
Status: DISCONTINUED | OUTPATIENT
Start: 2023-11-12 | End: 2023-11-13

## 2023-11-12 RX ADMIN — MUPIROCIN: 20 OINTMENT TOPICAL at 09:11

## 2023-11-12 RX ADMIN — LABETALOL HYDROCHLORIDE 100 MG: 100 TABLET, FILM COATED ORAL at 08:11

## 2023-11-12 RX ADMIN — AMLODIPINE BESYLATE 10 MG: 5 TABLET ORAL at 08:11

## 2023-11-12 RX ADMIN — ASPIRIN 81 MG: 81 TABLET, COATED ORAL at 12:11

## 2023-11-12 RX ADMIN — LABETALOL HYDROCHLORIDE 100 MG: 100 TABLET, FILM COATED ORAL at 09:11

## 2023-11-12 RX ADMIN — ACETAMINOPHEN 325MG 650 MG: 325 TABLET ORAL at 09:11

## 2023-11-12 RX ADMIN — MUPIROCIN: 20 OINTMENT TOPICAL at 08:11

## 2023-11-12 NOTE — PROGRESS NOTES
[facility] - 7th Floor ICU  Neurology  Progress Note    Patient Name: Trish Hale  MRN: [id number]  Admission Date: 11/10/2023  Hospital Length of Stay: 2 days  Code Status: Prior   Attending Provider: Ricardo Vanessa MD  Primary Care Physician: No, Primary Doctor   Principal Problem:<principal problem not specified>    HPI:   28-year-old female with past medical history of HTN, presented to ED on 11/10 for reports of slurred speech and right-sided weakness.  Symptoms began at 6:30 p.m..  She reported similar episode in July of 2023 that resolved without intervention.  CTH showed no acute intracranial abnormality.  She was hypertensive in ED and started on Cardene infusion.  She was given TNK at 10:15 p.m..  She was admitted to ICU for post TNK protocol.  Neurology is following for stroke workup.    Overview/Hospital Course:  No notes on file        Subjective:     Interval History: Sitting in bed.  Reports she has mild decreased sensation on right arm and leg.  Awaiting MRI brain.  Cardene was titrated off yesterday evening.      Current Facility-Administered Medications   Medication Dose Route Frequency Provider Last Rate Last Admin    acetaminophen tablet 650 mg  650 mg Oral Q6H PRN Rex Hull MD   650 mg at 11/11/23 1825    amLODIPine tablet 10 mg  10 mg Oral Daily Candy John DO   10 mg at 11/12/23 0815    labetaloL injection 10 mg  10 mg Intravenous Q4H PRN Candy John DO        labetaloL tablet 100 mg  100 mg Oral Q12H Candy John DO   100 mg at 11/12/23 0815    mupirocin 2 % ointment   Nasal BID ROSANA Lugo MD   Given at 11/12/23 0815       Review of Systems   Neurological:  Positive for numbness (right arm and leg (mild)). Negative for dizziness, tremors, seizures, syncope, facial asymmetry, speech difficulty, weakness, light-headedness and headaches.   All other systems reviewed and are negative.    Objective:     Vital Signs (Most Recent):  Temp: 98.8 °F (37.1 °C)  (11/12/23 0800)  Pulse: 84 (11/12/23 1100)  Resp: 16 (11/12/23 1100)  BP: (!) 146/108 (11/12/23 1100)  SpO2: 100 % (11/12/23 1100) Vital Signs (24h Range):  Temp:  [98.2 °F (36.8 °C)-98.8 °F (37.1 °C)] 98.8 °F (37.1 °C)  Pulse:  [71-94] 84  Resp:  [1-34] 16  SpO2:  [96 %-100 %] 100 %  BP: (112-149)/() 146/108     Weight: 111.1 kg (244 lb 14.9 oz)  Body mass index is 38.35 kg/m².     Physical Exam  Vitals reviewed.   Constitutional:       General: She is not in acute distress.     Appearance: She is not ill-appearing.   HENT:      Head: Normocephalic.   Eyes:      General: Vision grossly intact. No visual field deficit.     Extraocular Movements: Extraocular movements intact.      Pupils: Pupils are equal, round, and reactive to light.   Cardiovascular:      Rate and Rhythm: Normal rate.   Pulmonary:      Effort: Pulmonary effort is normal.   Skin:     General: Skin is warm and dry.      Capillary Refill: Capillary refill takes less than 2 seconds.   Neurological:      Mental Status: She is oriented to person, place, and time.      Cranial Nerves: No dysarthria or facial asymmetry.      Sensory: Sensory deficit (reports mild decreased sensation to tactile stimulation on right arm and leg) present.      Motor: No weakness or pronator drift.      Coordination: Coordination is intact.   Psychiatric:         Attention and Perception: Attention normal.         Mood and Affect: Mood and affect normal.         Speech: Speech normal.         Behavior: Behavior normal.        Significant Labs: BMP:   Recent Labs   Lab 11/10/23  2135 11/11/23  0405    139   K 2.6* 3.8   CO2 21* 21*   BUN 12.2 10.4   CREATININE 0.73 0.76   CALCIUM 7.9* 8.9   MG 1.60  --      CBC:   Recent Labs   Lab 11/10/23  2132 11/10/23  2135 11/11/23  0405   WBC  --  6.28 6.99   HGB  --  8.7* 10.9*   HCT 29* 27.3* 33.7*   PLT  --  212 227       Significant Imaging: I have reviewed all pertinent imaging results/findings within the past 24  hours.    Assessment and Plan:     Stroke  Stroke workup  - presented with slurred speech and right-sided weakness  - Stroke RF:  HTN  - Intervention: s/p TNK on 11/10 at 10:15 p.m..  No LVO on CTA.    - Etiology:  TBD    Stroke workup:  -CTh:  No acute intracranial abnormality.  -CTA h/n:  No acute abnormality. No high-grade stenosis or major vessel occlusion.  -ECHO: EF 55-60%, bubble study negative, normal LA  -CUS: negative  -LDL: 52  -A1c:  5.2  -TSH:  0.612  -not on antiplatelet, anticoagulation, or statin therapy home  -repeat CTh (24 hours after TNK): No acute intracranial process identified .... edited interpretation reported hypoattenuation left posterior parietal region      Plan:  -awaiting MRI brain  -add ASA 81mg daily  -ok to downgrade from ICU  -PT/OT/ST to evaluate  -defer workup for HTN to primary team  -encouraged smoking cessation and mediation compliance    Further recommendations to follow by MD.        VTE Risk Mitigation (From admission, onward)           Ordered     IP VTE HIGH RISK PATIENT  Once         11/11/23 0813     Place sequential compression device  Until discontinued         11/11/23 0813                    SASHA Gutierrez  Neurology  [facility] - 7th Floor ICU   10

## 2023-11-12 NOTE — H&P
[facility] - 7th Floor ICU    History & Physical      Patient Name: Trish Hale  MRN: [id number]  Admission Date: 11/10/2023  Attending Physician: Jaylen Kaur MD   Primary Care Provider: [facility]         Patient information was obtained from ER records.     Subjective:     Principal Problem:<principal problem not specified>    Chief Complaint:   Chief Complaint   Patient presents with    Cerebrovascular Accident     Pt presents with slurred speech, aphasia, R arm droop R weakness that started at 1830. [id number]        HPI: 28 y.o. Black or  female with a past medical history of hypertension. The patient presented to [facility] on 11/10/2023 with a primary complaint of slurred speech, right-sided weakness and numbness.  CT head was negative for acute intracranial abnormalities.  Patient received TNK.  Cardene drip was initiated in the ED and patient was admitted to ICU for closer monitoring.  Neurology was consulted and full stroke workup was completed.  CTA of the head and neck negative for acute abnormalities.  Echo with LVEF 55-60%.  Carotid ultrasound revealed no significant hemodynamically stenosis of the bilateral ICAs and bilateral vertebral arteries patent with antegrade flow.  CRP 9.46, ESR 57.  Lipid panel with HDL 33, hemoglobin A1c 5.2.  Repeat CT of the head yesterday (11/12/2023) negative for acute intracranial findings.  MRI of the brain is ordered but has not been completed yet.  On exam patient reports she continues to have right-sided leg weakness which has improved overall, slow speech and right-sided headache.  She denies complaints of vision changes, abdominal pain, nausea, vomiting, diarrhea, chest pain and shortness of breath.  Patient reports smoking 1 pack of cigarettes every 3 days.  Patient was cleared for downgrade from ICU to regular floor.    History reviewed. No pertinent past medical history.    History reviewed. No pertinent surgical  history.    Review of patient's allergies indicates:   Allergen Reactions    Sumatriptan Nausea And Vomiting       No current facility-administered medications on file prior to encounter.     No current outpatient medications on file prior to encounter.     Family History    None       Tobacco Use    Smoking status: Not on file    Smokeless tobacco: Not on file   Substance and Sexual Activity    Alcohol use: Not on file    Drug use: Not on file    Sexual activity: Not on file     Review of Systems   Constitutional:  Positive for fatigue.   HENT:  Positive for trouble swallowing and voice change.    Eyes: Negative.    Respiratory: Negative.     Cardiovascular:  Positive for palpitations.   Gastrointestinal: Negative.    Endocrine: Negative.    Genitourinary: Negative.    Musculoskeletal: Negative.    Skin: Negative.    Allergic/Immunologic: Negative.    Neurological: Negative.    Hematological: Negative.    Psychiatric/Behavioral:  The patient is nervous/anxious.      Objective:     Vital Signs (Most Recent):  Temp: 98.5 °F (36.9 °C) (11/12/23 1200)  Pulse: 81 (11/12/23 1400)  Resp: (!) 22 (11/12/23 1400)  BP: (!) 147/94 (11/12/23 1400)  SpO2: 98 % (11/12/23 1400) Vital Signs (24h Range):  Temp:  [98.4 °F (36.9 °C)-98.8 °F (37.1 °C)] 98.5 °F (36.9 °C)  Pulse:  [71-94] 81  Resp:  [1-34] 22  SpO2:  [98 %-100 %] 98 %  BP: (114-149)/() 147/94     Weight: 111.1 kg (244 lb 14.9 oz)  Body mass index is 38.35 kg/m².    Physical Exam  Vitals reviewed.   Constitutional:       Appearance: Normal appearance.   HENT:      Head: Normocephalic and atraumatic.      Right Ear: Tympanic membrane and external ear normal.      Nose: Nose normal.      Mouth/Throat:      Mouth: Mucous membranes are moist.   Eyes:      Extraocular Movements: Extraocular movements intact.      Pupils: Pupils are equal, round, and reactive to light.   Cardiovascular:      Rate and Rhythm: Normal rate and regular rhythm.      Pulses: Normal pulses.       Heart sounds: Normal heart sounds.   Pulmonary:      Effort: Pulmonary effort is normal.      Breath sounds: Normal breath sounds.   Abdominal:      General: Abdomen is flat. Bowel sounds are normal.      Palpations: Abdomen is soft.   Musculoskeletal:         General: Normal range of motion.      Cervical back: Normal range of motion and neck supple.   Skin:     General: Skin is warm and dry.   Neurological:      General: No focal deficit present.      Mental Status: She is alert and oriented to person, place, and time.   Psychiatric:         Mood and Affect: Mood normal.         Behavior: Behavior normal.           CRANIAL NERVES     CN III, IV, VI   Pupils are equal, round, and reactive to light.      Significant Labs: All pertinent labs within the past 24 hours have been reviewed.  Lab Results   Component Value Date    WBC 6.99 11/11/2023    HGB 10.9 (L) 11/11/2023    HCT 33.7 (L) 11/11/2023    MCV 76.9 (L) 11/11/2023     11/11/2023         Recent Labs   Lab 11/11/23  0405      K 3.8   CO2 21*   BUN 10.4   CREATININE 0.76   GLUCOSE 84   CALCIUM 8.9       Significant Imaging: I have reviewed all pertinent imaging results/findings within the past 24 hours.    Assessment/Plan:     Active Diagnoses:    Diagnosis Date Noted POA    Stroke [I63.9] 11/11/2023 Yes      Problems Resolved During this Admission:     VTE Risk Mitigation (From admission, onward)           Ordered     IP VTE HIGH RISK PATIENT  Once         11/11/23 0813     Place sequential compression device  Until discontinued         11/11/23 0813                    Acute CVA status post TNK   Hypertensive emergency  Tobacco use  Normocytic anemia, stable      Plan:  - Continue recommendations of Neurology   - Follow results of MRI of the brain  - Continue with physical occupational and speech therapy   - Resume appropriate home medications when deemed necessary   - Labs in AM  -scds for dvt ppx  Jaylen Kaur MD  Department of Hospital  Medicine   [facility]

## 2023-11-12 NOTE — PT/OT/SLP EVAL
"Occupational Therapy  Evaluation    Name: Trish Hale  MRN: [id number]  Admitting Diagnosis: CVA w/u   Recent Surgery: * No surgery found *      Recommendations:     Discharge therapy intensity: High Intensity Therapy   Discharge Equipment Recommendations:  walker, rolling, bath bench  Barriers to discharge:   (severity of illness)    Assessment:     Trish Hale is a 28 y.o. female with a medical diagnosis of CVA w/u. CTA negative. Pending MRI. Pt with similar episode of headache, impaired speech, R sided weakness, and blurred vision in July, however symptoms not as bad per pt report. She presents with the following performance deficits affecting function: weakness, impaired functional mobility, impaired coordination, impaired endurance, gait instability, decreased coordination, impaired fine motor, impaired balance, decreased upper extremity function, impaired self care skills, decreased lower extremity function. Pt requires CGA-Min A with RW for functional mobility as well as decreased strength, coordination, and motor planning in RUE/LE impacting pt's functional use of dominant UE and overall occupational performance.     Rehab Prognosis: Good; patient would benefit from acute skilled OT services to address these deficits and reach maximum level of function.       Plan:     Patient to be seen 5 x/week to address the above listed problems via self-care/home management, therapeutic activities, therapeutic exercises  Plan of Care Expires: 12/12/23  Plan of Care Reviewed with: patient    Subjective     Chief Complaint: "I miss my babies." "I need a shower."  Patient/Family Comments/goals: Get back to PLOF.    Occupational Profile:  Living Environment: Lives with 4 children (ages 1, 4, 7, & 10) in [facility] with 3-4 HOOD and unilat HR. Pt has a tub/shower combo and walk-in shower as well as standard commode in bathroom.  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  Equipment Used at Home: none  Assistance upon Discharge: Mother who lives in [place]. Mother is currently caring for pt's children right now, however she is still working. Unsure who would be able to care for pt during the day at home.    Pain/Comfort:  Pain Rating 1: 0/10    Patients cultural, spiritual, Mormonism conflicts given the current situation: no    Objective:     OT communicated with RN prior to session.  RB aware of pt's BP and ok OT to work with pt.    Patient was found supine with peripheral IV, telemetry, pulse ox (continuous), blood pressure cuff, SCD upon OT entry to room.    General Precautions: Standard, fall  Orthopedic Precautions:  SBP <180  Braces:      Vital Signs: Blood Pressure: 164/107  HR: 90  Sp02: 98% onb RA  Orthostatics: Supine 164/107, Sitting 159/107, [id number]    Bed Mobility:    Patient completed Supine to Sit with contact guard assistance    Functional Mobility/Transfers:  Patient completed Sit <> Stand Transfer with CGA-Min A  with  rolling walker   Patient completed Toilet Transfer Step Transfer technique with minimum assistance with  rolling walker  Functional Mobility: Pt ambulated from bed > toilet > chair with RW at Min A d/t decreased processing speed/motor planning & coordination in RUE/LE.    Activities of Daily Living: pt using R dominant UE for all task, however demo decreased processing speed/coordination  Feeding:  MONTERO pt states she fed herself with R dominant hand today but did need assist for opening some containers  Upper Body Dressing: contact guard assistance doff bra and don gown. Increased time for manipulation of fasteners on bra  Lower Body Dressing: contact guard assistance don/doff B socks  Toileting: contact guard assistance for static standing balance. Pt able to clean ant/post perineal area at SBA for safety seated on  "commode using R dominant UE.  Bathing: CGA pt performing sponge bath seated on commode       Functional Cognition:  Intact    Visual Perceptual Skills:  WNL. Pt wears glasses d/t poor vision without.    Upper Extremity Function:  Right Upper Extremity:   Range of Motion: WFL  Strength: 4/5 grossly  Coordination: impaired FMC/GMC - decreased processing speed for finger-to-nose assessment  & thumb to finger opposition.  Sensation: WFL    Left Upper Extremity:  WFL    Balance:   Static sitting balance: SPV  Dynamic sitting balance:SBA-CGA  Static standing balance:CGA with RW  Dynamic standing balance:Min A with RW    Therapeutic Positioning  Risk for acquired pressure injuries is decreased due to ability to get to BSC/toilet with assist.    OT interventions performed during the course of today's session:   Education was provided on benefits of and recommendations for therapeutic positioning    Skin assessment: full body skin assessment was performed  and all bony prominences were assessed    Findings: no redness or breakdown noted    OT recommendations for therapeutic positioning throughout hospitalization:   Follow [facility] Pressure Injury Prevention Protocol    Additional Treatment:  ADL Training: Pt completed bathing task seated on toilet to wash up body 2/2 pt stating "I haven't had a bath since being here."    Patient Education:  Patient provided with verbal education education regarding OT role/goals/POC, safety awareness, and Discharge/DME recommendations.  Understanding was verbalized.     Patient left up in chair with all lines intact and call button in reach. RN present.    GOALS:   Multidisciplinary Problems       Occupational Therapy Goals          Problem: Occupational Therapy    Goal Priority Disciplines Outcome Interventions   Occupational Therapy Goal     OT, PT/OT Ongoing, Progressing    Description: Goals to be met by: 12/12/23     Patient will increase functional independence with ADLs by " performing:    Feeding with [place].  UE Dressing with [place].  LE Dressing with [place].  Grooming while standing at sink with [place].  Toileting from toilet with [place] for hygiene and clothing management.   Toilet transfer to toilet with [place].  Increased functional strength to 5/5 for RUE grossly to improve functional use of R dominant UE.  Pt will perform RUE theraband HEP 2x10 reps to improve RUE strength and coordination.                         History:     History reviewed. No pertinent past medical history.    History reviewed. No pertinent surgical history.    Time Tracking:     OT Date of Treatment:    OT Start Time: 1152  OT Stop Time: 1231  OT Total Time (min): 39 min    Billable Minutes:Evaluation mod complex  Self Care/Home Management 1    11/12/2023

## 2023-11-12 NOTE — ASSESSMENT & PLAN NOTE
Stroke workup  - presented with slurred speech and right-sided weakness  - Stroke RF:  HTN  - Intervention: s/p TNK on 11/10 at 10:15 p.m..  No LVO on CTA.    - Etiology:  TBD    Stroke workup:  -CTh:  No acute intracranial abnormality.  -CTA h/n:  No acute abnormality. No high-grade stenosis or major vessel occlusion.  -ECHO: EF 55-60%, bubble study negative, normal LA  -CUS: negative  -LDL: 52  -A1c:  5.2  -TSH:  0.612  -not on antiplatelet, anticoagulation, or statin therapy home  -repeat CTh (24 hours after TNK): No acute intracranial process identified .... edited interpretation reported hypoattenuation left posterior parietal region      Plan:  -awaiting MRI brain  -add ASA 81mg daily  -ok to downgrade from ICU  -PT/OT/ST to evaluate  -defer workup for HTN to primary team  -encouraged smoking cessation and mediation compliance    Further recommendations to follow by MD.

## 2023-11-12 NOTE — NURSING
Nurses Note -- 4 Eyes      11/12/2023   9:25 AM      Skin assessed during: Daily Assessment      [x] No Altered Skin Integrity Present    [x]Prevention Measures Documented      [] Yes- Altered Skin Integrity Present or Discovered   [] LDA Added if Not in Epic (Describe Wound)   [] New Altered Skin Integrity was Present on Admit and Documented in LDA   [] Wound Image Taken    Wound Care Consulted? No    Attending Nurse:  Huma Nguyen RN/Staff Member:  Juvenal Lamb RN

## 2023-11-12 NOTE — PROGRESS NOTES
Pulmonary & Critical Care Medicine   Progress Note      Presenting History/HPI:  Trish Hale is a 28 y.o. female with PMH of HTN, who presented to the ED on 11/10/2023 with CC of slurred speech and right-sided weakness accompanied by numbness that started at 1830 on 11/10/2023. She states that she had similar episode in July 2023 that resolved without any intervention. Questionable compliance with antihypertensive medications. Admitted to the ICU for close monitoring s/p TNK.       Interval History:  -patient received TNK on 11/10  -repeat CT head demonstrates no intracranial abnormalities or hemorrhage  -the patient denies any significant weakness although she still has some residual weakness in the left hand  and left lower extremity      Scheduled Medications:    amLODIPine  10 mg Oral Daily    labetaloL  100 mg Oral Q12H    mupirocin   Nasal BID       PRN Medications:   acetaminophen, labetalol      Infusions:          Fluid Balance:     Intake/Output Summary (Last 24 hours) at 11/12/2023 0956  Last data filed at 11/12/2023 0800  Gross per 24 hour   Intake 1342.35 ml   Output 1800 ml   Net -457.65 ml           Vital Signs:   Vitals:    11/12/23 0900   BP: (!) 149/103   Pulse: 77   Resp: 16   Temp:          Physical Exam  Vitals and nursing note reviewed.   Constitutional:       General: She is not in acute distress.     Appearance: Normal appearance. She is not ill-appearing or toxic-appearing.   HENT:      Head: Normocephalic and atraumatic.      Right Ear: External ear normal.      Left Ear: External ear normal.      Nose: Nose normal.      Mouth/Throat:      Mouth: Mucous membranes are moist.      Pharynx: Oropharynx is clear. No oropharyngeal exudate or posterior oropharyngeal erythema.   Eyes:      General: No scleral icterus.     Extraocular Movements: Extraocular movements intact.      Conjunctiva/sclera: Conjunctivae normal.      Pupils: Pupils are equal, round, and reactive to light.   Neck:  "     Vascular: No carotid bruit.   Cardiovascular:      Rate and Rhythm: Normal rate and regular rhythm.      Pulses: Normal pulses.      Heart sounds: Normal heart sounds. No murmur heard.     No friction rub. No gallop.   Pulmonary:      Effort: Pulmonary effort is normal. No respiratory distress.      Breath sounds: Normal breath sounds. No wheezing, rhonchi or rales.   Abdominal:      General: Abdomen is flat. Bowel sounds are normal. There is no distension.      Palpations: Abdomen is soft.      Tenderness: There is no abdominal tenderness. There is no guarding or rebound.   Genitourinary:     Comments: deferred  Musculoskeletal:         General: No swelling or deformity. Normal range of motion.      Cervical back: Normal range of motion and neck supple. No rigidity or tenderness.   Lymphadenopathy:      Cervical: No cervical adenopathy.   Skin:     General: Skin is warm and dry.      Capillary Refill: Capillary refill takes less than 2 seconds.      Coloration: Skin is not jaundiced.      Findings: No bruising, lesion or rash.   Neurological:      General: No focal deficit present.      Mental Status: She is alert.      Sensory: No sensory deficit.      Motor: Weakness (LUE 4/5 strength, LLE 4/5 strength) present.   Psychiatric:         Mood and Affect: Mood normal.         Laboratory Studies:   No results for input(s): "PH", "PCO2", "PO2", "HCO3", "POCSATURATED", "BE" in the last 24 hours.  No results for input(s): "WBC", "RBC", "HGB", "HCT", "PLT", "MCV", "MCH", "MCHC" in the last 24 hours.  No results for input(s): "GLUCOSE", "NA", "K", "CL", "CO2", "BUN", "CREATININE", "CALCIUM", "MG" in the last 24 hours.      Microbiology Data:   Microbiology Results (last 7 days)       ** No results found for the last 168 hours. **              Imaging:   CT Head Without Contrast  START OF REPORT:  Technique: CT of the head was performed without intravenous contrast with axial as well as coronal and sagittal " images.    Comparison: None.    Dosage Information: Automated exposure control was utilized.    Clinical history: To rule out hemorrhage post-TNK in 24 hours.    Findings:  Hemorrhage: No acute intracranial hemorrhage is seen.  CSF spaces: The ventricles sulci and basal cisterns are within normal limits.  Brain parenchyma: Unremarkable with preservation of the grey white junction throughout.  Cerebellum: Unremarkable.  Sella and skull base: The sella appears to be within normal limits for age.  Herniation: None.  Intracranial calcifications: Incidental note is made of bilateral choroid plexus calcification. Incidental note is made of some pineal region calcification. Incidental note is made of some calcification of the falx.  Calvarium: No acute linear or depressed skull fracture is seen.    Maxillofacial Structures: There is a 14 mm cystic lesion in the alveolar process of the left maxilla that involves the root of the 2nd molar and projected into the maxillary sinus (series 4; image 4). This likely reflects a dental cyst.  Paranasal sinuses: The visualized paranasal sinuses appear clear with no mucoperiosteal thickening or air fluid levels identified.  Orbits: The orbits appear unremarkable.  Zygomatic arches: The zygomatic arches are intact and unremarkable.  Temporal bones and mastoids: The temporal bones and mastoids appear unremarkable.  TMJ: The mandibular condyles appear normally placed with respect to the mandibular fossa.    Visualized upper cervical spine: The visualized cervical spine appears unremarkable.    Impression:  1. No acute intracranial process identified. Details and other findings as noted above.  Echo Saline Bubble? Yes    Negative Bubble study.    Left Ventricle: The left ventricle is normal in size. The left   ventricle relative wall thickness is 0.52 cm. Mildly increased wall   thickness. Normal wall motion. There is normal systolic function with a   visually estimated ejection fraction  of 55 - 60%. There is normal   diastolic function.    Right Ventricle: Normal right ventricular cavity size. Systolic   function is normal.    Mitral Valve: There is no stenosis. The mean pressure gradient across   the mitral valve is 3 mmHg at a heart rate of  bpm.    Pulmonic Valve: There is no stenosis.    No significant valvular abnormalities noted in this study.          Assessment and Plan    Assessment:  Acute CVA s/p TNK on 11/10/2023 at 2215  Hypertensive emergency       Plan:  -blood pressure under appropriate control   -still with some residual weakness although imaging demonstrates no clear abnormalities   -evaluation per Neurology with concerns for atypical migraine versus TIA versus conversion disorder  -transfer out of ICU level care        Rex Hull MD  11/12/2023  Pulmonology/Critical Care

## 2023-11-12 NOTE — PLAN OF CARE
Problem: Physical Therapy  Goal: Physical Therapy Goal  Description: Goals to be met by: 12/10/23     Patient will increase functional independence with mobility by performin. Supine to sit with Stand-by Assistance  2. Sit to supine with Stand-by Assistance  3. Sit to stand transfer with Stand-by Assistance  4. Bed to chair transfer with Stand-by Assistance using Rolling Walker  5. Gait  x 150 feet with Stand-by Assistance using Rolling Walker.     Outcome: Ongoing, Progressing

## 2023-11-12 NOTE — PT/OT/SLP EVAL
Physical Therapy Evaluation    Patient Name:  Trish Hale   MRN:  [id number]    Recommendations:     Discharge Recommendations: Moderate Intensity Therapy   Discharge Equipment Recommendations: walker, rolling   Barriers to discharge:  acuity of medical condition    Assessment:     Trish Hale is a 28 y.o. female admitted with a medical diagnosis of CVA, hypokalemia.  She presents with the following impairments/functional limitations: weakness, impaired endurance, impaired functional mobility, gait instability, impaired balance, decreased lower extremity function .    Rehab Prognosis: Good; patient would benefit from acute skilled PT services to address these deficits and reach maximum level of function.    Recent Surgery: * No surgery found *      Plan:     During this hospitalization, patient to be seen BID to address the identified rehab impairments via gait training, therapeutic activities, therapeutic exercises, neuromuscular re-education and progress toward the following goals:    Plan of Care Expires:  12/10/23    Subjective     Chief Complaint: I feel weak  Patient/Family Comments/goals: to get back on my feet  Pain/Comfort:  Pain Rating 1: 0/10    Patients cultural, spiritual, Adventist conflicts given the current situation: no    Living Environment:  Lives at home with children, in a [facility] with 3 steps to enter with railing  Prior to admission, patients level of function was fully independent in ADL and not using AD for mobility, also employed.  Equipment used at home: none.  Upon discharge, patient will have assistance from family.    Objective:     Communicated with nursing prior to session.  Patient found supine with peripheral IV, PureWick, telemetry, Other (comments) (pneumatic compression both lower legs)  upon PT entry to room.    General Precautions: Standard, fall  /100, HR 92, O2sat 100%    Respiratory Status: Room air    Exams:  RLE ROM: WNL  RLE Strength: Deficits: 3/5  LLE ROM:  WNL  LLE Strength: WNL    Functional Mobility:  Bed Mobility:     Supine to Sit: minimum assistance and of 2x persons  Sit to Supine: minimum assistance and of 2x persons  Transfers:     Sit to Stand:  moderate assistance with rolling walker  Bed to Chair: moderate assistance with  rolling walker  using  Step Transfer  Gait: ambulates using FWW with mod assist x5ft      AM-PAC 6 CLICK MOBILITY  Total Score:10       Patient left supine with all lines intact and call button in reach.    GOALS:   Multidisciplinary Problems       Physical Therapy Goals          Problem: Physical Therapy    Goal Priority Disciplines Outcome Goal Variances Interventions   Physical Therapy Goal     PT, PT/OT Ongoing, Progressing     Description: Goals to be met by: 12/10/23     Patient will increase functional independence with mobility by performin. Supine to sit with Stand-by Assistance  2. Sit to supine with Stand-by Assistance  3. Sit to stand transfer with Stand-by Assistance  4. Bed to chair transfer with Stand-by Assistance using Rolling Walker  5. Gait  x 150 feet with Stand-by Assistance using Rolling Walker.                          History:     History reviewed. No pertinent past medical history.    History reviewed. No pertinent surgical history.    Time Tracking:     PT Received On:    PT Start Time: 1002     PT Stop Time: 1022  PT Total Time (min): 20 min     Billable Minutes: Evaluation moderate complexity      2023

## 2023-11-12 NOTE — H&P
[facility] Hospital Medicine History & Physical Examination       Patient Name: Trish Hale  MRN: [id number]  Patient Class: IP- Inpatient   Admission Date: 11/10/2023   Admitting Physician: ROSANA Lugo MD   Length of Stay: 2  Attending Physician: Ricardo Vanessa MD   Primary Care Provider: Jacoby  Face-to-Face encounter date: 11/12/2023  Code Status: Full Code   Chief Complaint: Cerebrovascular Accident (Pt presents with slurred speech, aphasia, R arm droop R weakness that started at 1830. [id number])        Patient information was obtained from patient, patient's family, past medical records and ER records.     HISTORY OF PRESENT ILLNESS:   Trish Hale is a 28 y.o. Black or  female with a past medical history of hypertension. The patient presented to [facility] on 11/10/2023 with a primary complaint of slurred speech, right-sided weakness and numbness.  CT head was negative for acute intracranial abnormalities.  Patient received TNK.  Cardene drip was initiated in the ED and patient was admitted to ICU for closer monitoring.  Neurology was consulted and full stroke workup was completed.  CTA of the head and neck negative for acute abnormalities.  Echo with LVEF 55-60%.  Carotid ultrasound revealed no significant hemodynamically stenosis of the bilateral ICAs and bilateral vertebral arteries patent with antegrade flow.  CRP 9.46, ESR 57.  Lipid panel with HDL 33, hemoglobin A1c 5.2.  Repeat CT of the head yesterday (11/12/2023) negative for acute intracranial findings.  MRI of the brain is ordered but has not been completed yet.  On exam patient reports she continues to have right-sided leg weakness which has improved overall, slow speech and right-sided headache.  She denies complaints of vision changes, abdominal pain, nausea, vomiting, diarrhea, chest pain and shortness of breath.  Patient reports smoking 1 pack of cigarettes every 3 days.  Patient was cleared for  downgrade from ICU to regular floor. She is admitted to hospital medicine services for assumption of care.    Patient's primary care doctor is Dr. Babin and Dr. Reynoso and therefore they will be taking over care on this patient as of 2023.     PAST MEDICAL HISTORY:   Hypertension    PAST SURGICAL HISTORY:    x2   Tubal ligation   Right knee surgery    ALLERGIES:   Sumatriptan    FAMILY HISTORY:   Mother: Hypertension    SOCIAL HISTORY:   Smokes 1 pack of cigarette every 3 days  Drinks alcohol once a week  Uses Adderall that is prescribed to a family member    HOME MEDICATIONS:   As documented    REVIEW OF SYSTEMS:   Except as documented, all other systems reviewed and negative     PHYSICAL EXAM:     VITAL SIGNS: 24 HRS MIN & MAX LAST   Temp  Min: 98.2 °F (36.8 °C)  Max: 98.8 °F (37.1 °C) 98.8 °F (37.1 °C)   BP  Min: 112/69  Max: 149/103 (!) 149/103   Pulse  Min: 71  Max: 94  78   Resp  Min: 1  Max: 34 17   SpO2  Min: 96 %  Max: 100 % 100 %       General appearance: Well-developed, well-nourished female in no apparent distress.  No family at bedside.  HEENT: Atraumatic head. Moist mucous membranes of oral cavity.  Lungs: Clear to auscultation bilaterally.   Heart: Regular rate and rhythm.   Abdomen: Soft, non-distended.   Extremities: No cyanosis, clubbing. No deformities.  Skin: No Rash. Warm and dry.  Neuro: Awake, alert and oriented.  Board of 5 strength to right upper and lower extremity.  5/5 strength to left extremities.  Speech slow but not slurred.  No facial droop.  Psych/mental status: Appropriate mood and affect. Cooperative. Responds appropriately to questions.       LABS AND IMAGING:     Recent Labs   Lab 11/10/23  2132 11/10/23  2135 11/11/23  0405   WBC  --  6.28 6.99   RBC  --  3.54* 4.38   HGB  --  8.7* 10.9*   HCT 29* 27.3* 33.7*   MCV  --  77.1* 76.9*   MCH  --  24.6* 24.9*   MCHC  --  31.9* 32.3*   RDW  --  15.1 15.1   PLT  --  212 227   MPV  --  10.1 10.5*       Recent  Labs   Lab 11/10/23  2135 11/11/23  0405    139   K 2.6* 3.8   CO2 21* 21*   BUN 12.2 10.4   CREATININE 0.73 0.76   CALCIUM 7.9* 8.9   MG 1.60  --    ALBUMIN 3.1* 3.6   ALKPHOS 68 72   ALT 16 16   AST 14 19   BILITOT 0.3 0.5       Microbiology Results (last 7 days)       ** No results found for the last 168 hours. **             CT Head Without Contrast  START OF REPORT:  Technique: CT of the head was performed without intravenous contrast with axial as well as coronal and sagittal images.    Comparison: None.    Dosage Information: Automated exposure control was utilized.    Clinical history: To rule out hemorrhage post-TNK in 24 hours.    Findings:  Hemorrhage: No acute intracranial hemorrhage is seen.  CSF spaces: The ventricles sulci and basal cisterns are within normal limits.  Brain parenchyma: Unremarkable with preservation of the grey white junction throughout.  Cerebellum: Unremarkable.  Sella and skull base: The sella appears to be within normal limits for age.  Herniation: None.  Intracranial calcifications: Incidental note is made of bilateral choroid plexus calcification. Incidental note is made of some pineal region calcification. Incidental note is made of some calcification of the falx.  Calvarium: No acute linear or depressed skull fracture is seen.    Maxillofacial Structures: There is a 14 mm cystic lesion in the alveolar process of the left maxilla that involves the root of the 2nd molar and projected into the maxillary sinus (series 4; image 4). This likely reflects a dental cyst.  Paranasal sinuses: The visualized paranasal sinuses appear clear with no mucoperiosteal thickening or air fluid levels identified.  Orbits: The orbits appear unremarkable.  Zygomatic arches: The zygomatic arches are intact and unremarkable.  Temporal bones and mastoids: The temporal bones and mastoids appear unremarkable.  TMJ: The mandibular condyles appear normally placed with respect to the mandibular  fossa.    Visualized upper cervical spine: The visualized cervical spine appears unremarkable.    Impression:  1. No acute intracranial process identified. Details and other findings as noted above.  Echo Saline Bubble? Yes    Negative Bubble study.    Left Ventricle: The left ventricle is normal in size. The left   ventricle relative wall thickness is 0.52 cm. Mildly increased wall   thickness. Normal wall motion. There is normal systolic function with a   visually estimated ejection fraction of 55 - 60%. There is normal   diastolic function.    Right Ventricle: Normal right ventricular cavity size. Systolic   function is normal.    Mitral Valve: There is no stenosis. The mean pressure gradient across   the mitral valve is 3 mmHg at a heart rate of  bpm.    Pulmonic Valve: There is no stenosis.    No significant valvular abnormalities noted in this study.        ASSESSMENT & PLAN:   Assessment:  Acute CVA status post TNK   Hypertensive emergency  Tobacco use  Normocytic anemia, stable     Plan:  - Continue recommendations of Neurology   - Follow results of MRI of the brain  - Continue with physical occupational and speech therapy   - Resume appropriate home medications when deemed necessary   - Labs in AM      VTE Prophylaxis: will be placed on SCD for DVT prophylaxis and will be advised to be as mobile as possible and sit in a chair as tolerated      __________________________________________________________________________  INPATIENT LIST OF MEDICATIONS     Current Facility-Administered Medications:     acetaminophen tablet 650 mg, 650 mg, Oral, Q6H PRN, Rex Hull MD, 650 mg at 11/11/23 1825    amLODIPine tablet 10 mg, 10 mg, Oral, Daily, Ng, Zui Keat, DO, 10 mg at 11/12/23 0815    labetaloL injection 10 mg, 10 mg, Intravenous, Q4H PRN, Alvaro, Zui Keat, DO    labetaloL tablet 100 mg, 100 mg, Oral, Q12H, Ng, Zui Keat, DO, 100 mg at 11/12/23 0815    mupirocin 2 % ointment, , Nasal, BID, ROSANA Lugo MD,  Given at 11/12/23 0815      Scheduled Meds:   amLODIPine  10 mg Oral Daily    labetaloL  100 mg Oral Q12H    mupirocin   Nasal BID     Continuous Infusions:  PRN Meds:.acetaminophen, labetalol      Discharge Planning and Disposition: Anticipated discharge to be determined.    IMaya PA, have reviewed and discussed the case with Dr. Ricardo Vanessa.    Please see the following addendum for further assessment and plan from there attending MD.    Maya Jones PA-C  11/12/2023      Seen and examined the patient.  Agree with above history physical exam and management.    Presenting with hypertensive emergency and possibility of stroke.  Received TNK repeat CT is negative.  Waiting for MRI   She has right-sided weakness if MRI is negative might need MRI brain with contrast and also LP    Ricardo Vanessa M.D.  [facility]/Hospital Medicine Department  [facility]

## 2023-11-12 NOTE — SUBJECTIVE & OBJECTIVE
Subjective:     Interval History: Sitting in bed.  Reports she has mild decreased sensation on right arm and leg.  Awaiting MRI brain.  Cardene was titrated off yesterday evening.      Current Facility-Administered Medications   Medication Dose Route Frequency Provider Last Rate Last Admin    acetaminophen tablet 650 mg  650 mg Oral Q6H PRN Rex Hull MD   650 mg at 11/11/23 1825    amLODIPine tablet 10 mg  10 mg Oral Daily Candy Johnat, DO   10 mg at 11/12/23 0815    labetaloL injection 10 mg  10 mg Intravenous Q4H PRN Alvaro Candy Keat, DO        labetaloL tablet 100 mg  100 mg Oral Q12H Candy Johnat, DO   100 mg at 11/12/23 0815    mupirocin 2 % ointment   Nasal BID ROSANA Lugo MD   Given at 11/12/23 0815       Review of Systems   Neurological:  Positive for numbness (right arm and leg (mild)). Negative for dizziness, tremors, seizures, syncope, facial asymmetry, speech difficulty, weakness, light-headedness and headaches.   All other systems reviewed and are negative.    Objective:     Vital Signs (Most Recent):  Temp: 98.8 °F (37.1 °C) (11/12/23 0800)  Pulse: 84 (11/12/23 1100)  Resp: 16 (11/12/23 1100)  BP: (!) 146/108 (11/12/23 1100)  SpO2: 100 % (11/12/23 1100) Vital Signs (24h Range):  Temp:  [98.2 °F (36.8 °C)-98.8 °F (37.1 °C)] 98.8 °F (37.1 °C)  Pulse:  [71-94] 84  Resp:  [1-34] 16  SpO2:  [96 %-100 %] 100 %  BP: (112-149)/() 146/108     Weight: 111.1 kg (244 lb 14.9 oz)  Body mass index is 38.35 kg/m².     Physical Exam  Vitals reviewed.   Constitutional:       General: She is not in acute distress.     Appearance: She is not ill-appearing.   HENT:      Head: Normocephalic.   Eyes:      General: Vision grossly intact. No visual field deficit.     Extraocular Movements: Extraocular movements intact.      Pupils: Pupils are equal, round, and reactive to light.   Cardiovascular:      Rate and Rhythm: Normal rate.   Pulmonary:      Effort: Pulmonary effort is normal.   Skin:     General:  Skin is warm and dry.      Capillary Refill: Capillary refill takes less than 2 seconds.   Neurological:      Mental Status: She is oriented to person, place, and time.      Cranial Nerves: No dysarthria or facial asymmetry.      Sensory: Sensory deficit (reports mild decreased sensation to tactile stimulation on right arm and leg) present.      Motor: No weakness or pronator drift.      Coordination: Coordination is intact.   Psychiatric:         Attention and Perception: Attention normal.         Mood and Affect: Mood and affect normal.         Speech: Speech normal.         Behavior: Behavior normal.        Significant Labs: BMP:   Recent Labs   Lab 11/10/23  2135 11/11/23  0405    139   K 2.6* 3.8   CO2 21* 21*   BUN 12.2 10.4   CREATININE 0.73 0.76   CALCIUM 7.9* 8.9   MG 1.60  --      CBC:   Recent Labs   Lab 11/10/23  2132 11/10/23  2135 11/11/23  0405   WBC  --  6.28 6.99   HGB  --  8.7* 10.9*   HCT 29* 27.3* 33.7*   PLT  --  212 227       Significant Imaging: I have reviewed all pertinent imaging results/findings within the past 24 hours.

## 2023-11-12 NOTE — PLAN OF CARE
Problem: Occupational Therapy  Goal: Occupational Therapy Goal  Description: Goals to be met by: 12/12/23     Patient will increase functional independence with ADLs by performing:    Feeding with [place].  UE Dressing with [place].  LE Dressing with [place].  Grooming while standing at sink with [place].  Toileting from toilet with [place] for hygiene and clothing management.   Toilet transfer to toilet with [place].  Increased functional strength to 5/5 for RUE grossly to improve functional use of R dominant UE.  Pt will perform RUE theraband HEP 2x10 reps to improve RUE strength and coordination.    Outcome: Ongoing, Progressing

## 2023-11-13 PROCEDURE — 99233 PR SUBSEQUENT HOSPITAL CARE,LEVL III: ICD-10-PCS | Mod: ,,, | Performed by: PSYCHIATRY & NEUROLOGY

## 2023-11-13 PROCEDURE — 25000003 PHARM REV CODE 250: Performed by: INTERNAL MEDICINE

## 2023-11-13 PROCEDURE — 25000003 PHARM REV CODE 250: Performed by: NURSE PRACTITIONER

## 2023-11-13 PROCEDURE — 97530 THERAPEUTIC ACTIVITIES: CPT | Mod: CQ

## 2023-11-13 PROCEDURE — 99233 SBSQ HOSP IP/OBS HIGH 50: CPT | Mod: ,,, | Performed by: PSYCHIATRY & NEUROLOGY

## 2023-11-13 PROCEDURE — 97535 SELF CARE MNGMENT TRAINING: CPT | Mod: CO

## 2023-11-13 PROCEDURE — 25000003 PHARM REV CODE 250: Performed by: STUDENT IN AN ORGANIZED HEALTH CARE EDUCATION/TRAINING PROGRAM

## 2023-11-13 PROCEDURE — 21400001 HC TELEMETRY ROOM

## 2023-11-13 PROCEDURE — 11000001 HC ACUTE MED/SURG PRIVATE ROOM

## 2023-11-13 PROCEDURE — 97116 GAIT TRAINING THERAPY: CPT | Mod: CQ

## 2023-11-13 RX ORDER — HYDROCODONE BITARTRATE AND ACETAMINOPHEN 10; 325 MG/1; MG/1
1 TABLET ORAL EVERY 4 HOURS PRN
Status: DISCONTINUED | OUTPATIENT
Start: 2023-11-13 | End: 2023-11-15

## 2023-11-13 RX ORDER — ASPIRIN 81 MG/1
81 TABLET ORAL DAILY
Status: DISCONTINUED | OUTPATIENT
Start: 2023-11-14 | End: 2023-11-20 | Stop reason: HOSPADM

## 2023-11-13 RX ADMIN — AMLODIPINE BESYLATE 10 MG: 5 TABLET ORAL at 08:11

## 2023-11-13 RX ADMIN — HYDROCODONE BITARTRATE AND ACETAMINOPHEN 1 TABLET: 10; 325 TABLET ORAL at 08:11

## 2023-11-13 RX ADMIN — LABETALOL HYDROCHLORIDE 100 MG: 100 TABLET, FILM COATED ORAL at 08:11

## 2023-11-13 RX ADMIN — MUPIROCIN: 20 OINTMENT TOPICAL at 08:11

## 2023-11-13 RX ADMIN — ACETAMINOPHEN 325MG 650 MG: 325 TABLET ORAL at 09:11

## 2023-11-13 RX ADMIN — HYDROCODONE BITARTRATE AND ACETAMINOPHEN 1 TABLET: 10; 325 TABLET ORAL at 02:11

## 2023-11-13 RX ADMIN — ASPIRIN 81 MG: 81 TABLET, COATED ORAL at 08:11

## 2023-11-13 NOTE — PT/OT/SLP PROGRESS
Physical Therapy Treatment    Patient Name:  Trish Hale   MRN:  [id number]    Recommendations:     Discharge therapy intensity: Low Intensity Therapy   Discharge Equipment Recommendations: walker, rolling  Barriers to discharge: Impaired mobility    Assessment:     rTish Hale is a 28 y.o. female admitted with a medical diagnosis of CVA, hypokalemia .  She presents with the following impairments/functional limitations: weakness, impaired functional mobility, gait instability, impaired endurance, decreased lower extremity function, impaired self care skills, impaired balance.    Rehab Prognosis: Good; patient would benefit from acute skilled PT services to address these deficits and reach maximum level of function.    Recent Surgery: * No surgery found *      Plan:     During this hospitalization, patient to be seen BID to address the identified rehab impairments via gait training, therapeutic activities, therapeutic exercises and progress toward the following goals:    Plan of Care Expires:  12/10/23    Subjective     Chief Complaint: None stated  Patient/Family Comments/goals: to get back home to her kids.  Pain/Comfort:  Pain Rating 1: 7/10 (L jaw/tooth pain)  Pain Addressed 1: Pre-medicate for activity, Nurse notified      Objective:     Communicated with Nurse prior to session.  Patient found HOB elevated with   upon PT entry to room.     General Precautions: Standard, fall  Orthopedic Precautions: N/A  Braces: N/A  Respiratory Status: Room air  Blood Pressure: Not taken  Skin Integrity: Visible skin intact      Functional Mobility:  Bed Mobility:     Supine to Sit: CGA.   Transfers:     Sit to Stand:  contact guard assistance with rolling walker  Gait: Pt. Ambulated ~75' Min A with RW and chair following behind for safety. Noted RLE shakiness, tremors, and unsteadiness. 1 minor LOB secondary to decreased L foot clearance, but self-corrected.    Therapeutic Activities/Exercises:  Pt. performed CRICKET SLR's ~2  reps. Noted RLE weakness.    Education:  Patient provided with verbal education education regarding POC.  Understanding was verbalized, however additional teaching warranted.     Patient left up in chair with call button in reach and chair alarm on..    GOALS:   Multidisciplinary Problems       Physical Therapy Goals          Problem: Physical Therapy    Goal Priority Disciplines Outcome Goal Variances Interventions   Physical Therapy Goal     PT, PT/OT Ongoing, Progressing     Description: Goals to be met by: 12/10/23     Patient will increase functional independence with mobility by performin. Supine to sit with Stand-by Assistance  2. Sit to supine with Stand-by Assistance  3. Sit to stand transfer with Stand-by Assistance  4. Bed to chair transfer with Stand-by Assistance using Rolling Walker  5. Gait  x 150 feet with Stand-by Assistance using Rolling Walker.                          Time Tracking:     PT Received On: 23  PT Start Time: 1055     PT Stop Time: 1120  PT Total Time (min): 25 min     Billable Minutes: Gait Training 1 unit and Therapeutic Activity 1 unit    Treatment Type: Treatment  PT/PTA: PTA     Number of PTA visits since last PT visit: 2023

## 2023-11-13 NOTE — PT/OT/SLP DISCHARGE
[facility]  Speech Language Pathology Department  Discharge Summary    Patient Name:  Trihs Hale   MRN:  [id number]    Recommendations:     General recommendations: SLP intervention not indicated  Discharge therapy intensity:   none  Diet texture/consistency recommendations:  Regular solids (IDDSI 7) and thin liquids (IDDSI 0)  Medications: per patient preference  Aspiration precautions: small bites/sips and slow rate  General precautions: Standard,    Communication strategies:  none    MRI negative for acute infarct. No further testing is warranted at this time. Please re-consult as warranted.

## 2023-11-13 NOTE — PROGRESS NOTES
[facility] - 7th Floor [facility] Medicine  Progress Note    Patient Name: Trish Hale  MRN: [id number]  Patient Class: IP- Inpatient   Admission Date: 11/10/2023  Length of Stay: 3 days  Attending Physician: Jaylen Kaur MD  Primary Care Provider: Wendy Babin MD        Subjective:     Principal Problem:<principal problem not specified>    Interval History:   Today's info : seen and examined, no acute events overnight. Continues to improve   Bp improving  Headache remains  Mastoid cyst on ct  Mri unremarkable    Review of Systems   Constitutional: Negative.    HENT: Negative.     Eyes: Negative.    Respiratory: Negative.     Cardiovascular: Negative.    Gastrointestinal: Negative.    Endocrine: Negative.    Genitourinary: Negative.    Musculoskeletal: Negative.    Skin: Negative.    Allergic/Immunologic: Negative.  Food allergies: manthena.   Neurological:  Positive for dizziness, weakness and headaches.   Hematological: Negative.    Psychiatric/Behavioral: Negative.       Objective:     Vital Signs (Most Recent):  Temp: 98.4 °F (36.9 °C) (11/13/23 0000)  Pulse: 84 (11/12/23 2107)  Resp: 18 (11/13/23 1400)  BP: (!) 153/99 (11/13/23 0808)  SpO2: 100 % (11/12/23 1800) Vital Signs (24h Range):  Temp:  [98.1 °F (36.7 °C)-98.4 °F (36.9 °C)] 98.4 °F (36.9 °C)  Pulse:  [78-84] 84  Resp:  [16-18] 18  SpO2:  [99 %-100 %] 100 %  BP: (135-153)/(86-99) 153/99     Weight: 111.1 kg (244 lb 14.9 oz)  Body mass index is 38.35 kg/m².    Intake/Output Summary (Last 24 hours) at 11/13/2023 1518  Last data filed at 11/12/2023 2300  Gross per 24 hour   Intake 1080.36 ml   Output 1100 ml   Net -19.64 ml      Physical Exam  Vitals reviewed.   Constitutional:       Appearance: Normal appearance.   HENT:      Head: Normocephalic and atraumatic.      Right Ear: Tympanic membrane and external ear normal.      Nose: Nose normal.      Mouth/Throat:      Mouth: Mucous membranes are moist.   Eyes:       Extraocular Movements: Extraocular movements intact.      Pupils: Pupils are equal, round, and reactive to light.   Cardiovascular:      Rate and Rhythm: Normal rate and regular rhythm.      Pulses: Normal pulses.      Heart sounds: Normal heart sounds.   Pulmonary:      Effort: Pulmonary effort is normal.      Breath sounds: Normal breath sounds.   Abdominal:      General: Abdomen is flat. Bowel sounds are normal.      Palpations: Abdomen is soft.   Musculoskeletal:         General: Normal range of motion.      Cervical back: Normal range of motion and neck supple.   Skin:     General: Skin is warm and dry.   Neurological:      General: No focal deficit present.      Mental Status: She is alert and oriented to person, place, and time.   Psychiatric:         Mood and Affect: Mood normal.         Behavior: Behavior normal.           Overview/Hospital Course: stable    Significant Labs: All pertinent labs within the past 24 hours have been reviewed.  Lab Results   Component Value Date    WBC 6.99 11/11/2023    HGB 10.9 (L) 11/11/2023    HCT 33.7 (L) 11/11/2023    MCV 76.9 (L) 11/11/2023     11/11/2023         Recent Labs   Lab 11/11/23  0405      K 3.8   CO2 21*   BUN 10.4   CREATININE 0.76   GLUCOSE 84   CALCIUM 8.9       Significant Imaging: I have reviewed all pertinent imaging results/findings within the past 24 hours.    Assessment/Plan:      Active Diagnoses:    Diagnosis Date Noted POA    Stroke [I63.9] 11/11/2023 Yes      Problems Resolved During this Admission:     VTE Risk Mitigation (From admission, onward)           Ordered     IP VTE HIGH RISK PATIENT  Once         11/11/23 0813     Place sequential compression device  Until discontinued         11/11/23 0813                    Acute CVA status post TNK   Hypertensive emergency  Tobacco use  Normocytic anemia, stable      Plan:  Neuro reccs appreciate  Add norco for headache  Pt//ot  Add norvasc po  Once symptoms improve and bp  controlled  Will dc soon  Anticipated dc in 24 to 48 hrs    Jaylen Kaur MD  Department of Hospital Medicine   [facility] - 7th Floor ICU

## 2023-11-13 NOTE — PROGRESS NOTES
[facility] - 7th Floor ICU  Neurology  Progress Note    Patient Name: Trish Hale  MRN: [id number]  Admission Date: 11/10/2023  Hospital Length of Stay: 3 days  Code Status: Prior   Attending Provider: Jaylen Kaur MD  Primary Care Physician: No, Primary Doctor   Principal Problem:<principal problem not specified>    HPI:   28-year-old female with past medical history of HTN, presented to ED on 11/10 for reports of slurred speech and right-sided weakness.  Symptoms began at 6:30 p.m..  She reported similar episode in July of 2023 that resolved without intervention.  CTH showed no acute intracranial abnormality.  She was hypertensive in ED and started on Cardene infusion.  She was given TNK at 10:15 p.m..  She was admitted to ICU for post TNK protocol.  Neurology is following for stroke workup.      Subjective:     Interval History: Sitting up in bed.  MRI brain negative for acute infarct.  No new neurological issues overnight.      Current Facility-Administered Medications   Medication Dose Route Frequency Provider Last Rate Last Admin    acetaminophen tablet 650 mg  650 mg Oral Q6H PRN Rex Hull MD   650 mg at 11/13/23 0953    amLODIPine tablet 10 mg  10 mg Oral Daily Candy John, DO   10 mg at 11/13/23 0808    labetaloL injection 10 mg  10 mg Intravenous Q4H PRN Candy John, DO        labetaloL tablet 100 mg  100 mg Oral Q12H Candy John, DO   100 mg at 11/13/23 0808    mupirocin 2 % ointment   Nasal BID ROSANA Lugo MD   Given at 11/13/23 0808       Review of Systems   Neurological:  Positive for numbness (right arm and leg (mild)). Negative for dizziness, tremors, seizures, syncope, facial asymmetry, speech difficulty, weakness, light-headedness and headaches.   All other systems reviewed and are negative.    Objective:     Vital Signs (Most Recent):  Temp: 98.4 °F (36.9 °C) (11/13/23 0000)  Pulse: 84 (11/12/23 2107)  Resp: 18 (11/12/23 1800)  BP: (!) 153/99 (11/13/23  0808)  SpO2: 100 % (11/12/23 1800) Vital Signs (24h Range):  Temp:  [98.1 °F (36.7 °C)-98.5 °F (36.9 °C)] 98.4 °F (36.9 °C)  Pulse:  [78-84] 84  Resp:  [14-22] 18  SpO2:  [98 %-100 %] 100 %  BP: (135-153)/() 153/99     Weight: 111.1 kg (244 lb 14.9 oz)  Body mass index is 38.35 kg/m².     Physical Exam  Vitals reviewed.   Constitutional:       General: She is not in acute distress.     Appearance: She is not ill-appearing.   HENT:      Head: Normocephalic.   Eyes:      General: Vision grossly intact. No visual field deficit.     Extraocular Movements: Extraocular movements intact.      Pupils: Pupils are equal, round, and reactive to light.   Cardiovascular:      Rate and Rhythm: Normal rate.   Pulmonary:      Effort: Pulmonary effort is normal.   Skin:     General: Skin is warm and dry.      Capillary Refill: Capillary refill takes less than 2 seconds.   Neurological:      Mental Status: She is oriented to person, place, and time.      Cranial Nerves: No dysarthria or facial asymmetry.      Sensory: Sensory deficit (reports mild decreased sensation to tactile stimulation on right arm and leg) present.      Motor: No weakness or pronator drift.      Coordination: Coordination is intact.      Comments:   Speech slowed   Psychiatric:         Attention and Perception: Attention normal.         Mood and Affect: Mood normal.         Behavior: Behavior is cooperative.        Significant Labs: None    Significant Imaging: I have reviewed all pertinent imaging results/findings within the past 24 hours.    Assessment and Plan:     Stroke  Stroke workup s/p TNK  - presented with slurred speech and right-sided weakness  - Stroke RF:  HTN  - Intervention: s/p TNK on 11/10 at 10:15 p.m..  No LVO on CTA.      Stroke workup negative for acute infarct  -CTh:  No acute intracranial abnormality.  -CTA h/n:  No acute abnormality. No high-grade stenosis or major vessel occlusion.  -ECHO: EF 55-60%, bubble study negative, normal  LA  -CUS: negative  -LDL: 52  -A1c:  5.2  -TSH:  0.612  -not on antiplatelet, anticoagulation, or statin therapy home  -repeat CTh (24 hours after TNK): No acute intracranial process identified .... edited interpretation reported hypoattenuation left posterior parietal region  -MRI brain: No acute intracranial findings identified        Plan:  -continue ASA 81mg daily  -continue PT/OT   -defer workup for HTN to primary team  -encouraged smoking cessation and mediation compliance    Further recommendations to follow by MD.        VTE Risk Mitigation (From admission, onward)           Ordered     IP VTE HIGH RISK PATIENT  Once         11/11/23 0813     Place sequential compression device  Until discontinued         11/11/23 0813                    SASHA Gutierrez  Neurology  [facility] - 7th Floor ICU

## 2023-11-13 NOTE — SUBJECTIVE & OBJECTIVE
Subjective:     Interval History: Sitting up in bed.  MRI brain completed, radiology interpretation pending.  No new neurological issues overnight.      Current Facility-Administered Medications   Medication Dose Route Frequency Provider Last Rate Last Admin    acetaminophen tablet 650 mg  650 mg Oral Q6H PRN Rex Hull MD   650 mg at 11/13/23 0953    amLODIPine tablet 10 mg  10 mg Oral Daily Candy John, DO   10 mg at 11/13/23 0808    labetaloL injection 10 mg  10 mg Intravenous Q4H PRN Candy Johnat, DO        labetaloL tablet 100 mg  100 mg Oral Q12H Candy Johnat, DO   100 mg at 11/13/23 0808    mupirocin 2 % ointment   Nasal BID ROSANA Lugo MD   Given at 11/13/23 0808       Review of Systems   Neurological:  Positive for numbness (right arm and leg (mild)). Negative for dizziness, tremors, seizures, syncope, facial asymmetry, speech difficulty, weakness, light-headedness and headaches.   All other systems reviewed and are negative.    Objective:     Vital Signs (Most Recent):  Temp: 98.4 °F (36.9 °C) (11/13/23 0000)  Pulse: 84 (11/12/23 2107)  Resp: 18 (11/12/23 1800)  BP: (!) 153/99 (11/13/23 0808)  SpO2: 100 % (11/12/23 1800) Vital Signs (24h Range):  Temp:  [98.1 °F (36.7 °C)-98.5 °F (36.9 °C)] 98.4 °F (36.9 °C)  Pulse:  [78-84] 84  Resp:  [14-22] 18  SpO2:  [98 %-100 %] 100 %  BP: (135-153)/() 153/99     Weight: 111.1 kg (244 lb 14.9 oz)  Body mass index is 38.35 kg/m².     Physical Exam  Vitals reviewed.   Constitutional:       General: She is not in acute distress.     Appearance: She is not ill-appearing.   HENT:      Head: Normocephalic.   Eyes:      General: Vision grossly intact. No visual field deficit.     Extraocular Movements: Extraocular movements intact.      Pupils: Pupils are equal, round, and reactive to light.   Cardiovascular:      Rate and Rhythm: Normal rate.   Pulmonary:      Effort: Pulmonary effort is normal.   Skin:     General: Skin is warm and dry.       Capillary Refill: Capillary refill takes less than 2 seconds.   Neurological:      Mental Status: She is oriented to person, place, and time.      Cranial Nerves: No dysarthria or facial asymmetry.      Sensory: Sensory deficit (reports mild decreased sensation to tactile stimulation on right arm and leg) present.      Motor: No weakness or pronator drift.      Coordination: Coordination is intact.      Comments:   Speech slowed   Psychiatric:         Attention and Perception: Attention normal.         Mood and Affect: Mood normal.         Behavior: Behavior is cooperative.        Significant Labs: None    Significant Imaging: I have reviewed all pertinent imaging results/findings within the past 24 hours.

## 2023-11-13 NOTE — PLAN OF CARE
11/13/23 1129   Discharge Assessment   Assessment Type Discharge Planning Assessment   Confirmed/corrected address, phone number and insurance Yes   Confirmed Demographics Correct on Facesheet   Source of Information patient   Does patient/caregiver understand observation status Yes   Communicated HOMA with patient/caregiver Yes;Date not available/Unable to determine   People in Home child(chioma), dependent   Do you expect to return to your current living situation? Yes   Prior to hospitilization cognitive status: Alert/Oriented   Current cognitive status: Alert/Oriented   Equipment Currently Used at Home none   Readmission within 30 days? No   Do you currently have service(s) that help you manage your care at home? No   Do you take prescription medications? Yes   Do you have prescription coverage? Yes   Coverage Medicaid/C   Who is going to help you get home at discharge? Family can .   How do you get to doctors appointments? family or friend will provide;car, drives self   Are you on dialysis? No   Do you take coumadin? No   Discharge Plan discussed with: Patient   Discharge Plan A Other  (Outpatient Therapy)   Discharge Plan B Rehab

## 2023-11-13 NOTE — ASSESSMENT & PLAN NOTE
Stroke workup s/p TNK  - presented with slurred speech and right-sided weakness  - Stroke RF:  HTN  - Intervention: s/p TNK on 11/10 at 10:15 p.m..  No LVO on CTA.      Stroke workup negative for acute infarct  -CTh:  No acute intracranial abnormality.  -CTA h/n:  No acute abnormality. No high-grade stenosis or major vessel occlusion.  -ECHO: EF 55-60%, bubble study negative, normal LA  -CUS: negative  -LDL: 52  -A1c:  5.2  -TSH:  0.612  -not on antiplatelet, anticoagulation, or statin therapy home  -repeat CTh (24 hours after TNK): No acute intracranial process identified .... edited interpretation reported hypoattenuation left posterior parietal region  -MRI brain: No acute intracranial findings identified        Plan:  -continue ASA 81mg daily  -continue PT/OT   -defer workup for HTN to primary team  -encouraged smoking cessation and mediation compliance    Further recommendations to follow by MD.

## 2023-11-13 NOTE — ASSESSMENT & PLAN NOTE
Stroke workup s/p TNK  - presented with slurred speech and right-sided weakness  - Stroke RF:  HTN  - Intervention: s/p TNK on 11/10 at 10:15 p.m..  No LVO on CTA.    - Etiology:  TBD    Stroke workup:  -CTh:  No acute intracranial abnormality.  -CTA h/n:  No acute abnormality. No high-grade stenosis or major vessel occlusion.  -ECHO: EF 55-60%, bubble study negative, normal LA  -CUS: negative  -LDL: 52  -A1c:  5.2  -TSH:  0.612  -not on antiplatelet, anticoagulation, or statin therapy home  -repeat CTh (24 hours after TNK): No acute intracranial process identified .... edited interpretation reported hypoattenuation left posterior parietal region  -MRI brain: completed, interpretation pending      Plan:  -continue ASA 81mg daily  -continue PT/OT   -defer workup for HTN to primary team  -encouraged smoking cessation and mediation compliance    Further recommendations to follow by MD.

## 2023-11-13 NOTE — PT/OT/SLP PROGRESS
Occupational Therapy   Treatment    Name: Trish Hale  MRN: [id number]  Admitting Diagnosis:  <principal problem not specified>       Recommendations:     Recommended therapy intensity at discharge: Low Intensity Therapy   Discharge Equipment Recommendations:  walker, rolling, bath bench  Barriers to discharge:       Assessment:     Trish Hale is a 28 y.o. female with a medical diagnosis of <principal problem not specified>.  She presents with increased endurance and improved balance using RW. Pleasant and cooperative, pt. Progressing well overall, continue POC Performance deficits affecting function are weakness, impaired endurance, impaired self care skills, impaired functional mobility, impaired balance.     Rehab Prognosis:  Good; patient would benefit from acute skilled OT services to address these deficits and reach maximum level of function.       Plan:     Patient to be seen 5 x/week to address the above listed problems via self-care/home management, therapeutic activities, therapeutic exercises  Plan of Care Expires: 12/12/23  Plan of Care Reviewed with: patient    Subjective     Pain/Comfort:     Orientated x 4  Objective:     Communicated with: RN prior to session.  Patient found HOB elevated with   upon OT entry to room.    General Precautions: Standard, fall    Orthopedic Precautions:   Braces:    Respiratory Status: Room air  Vital Signs: Blood Pressure: 145/75     Occupational Performance:   (Bed Mobility- CGA)  (Sitting balance-SBA)  Pt. Expressing difficulty with donning/doffing socks to R LE, pt. Would benefit from hip kit.   (Sit to stand- CGA)  Pt. Ambulating from EOB to bathroom using RW for UE support with balance Min A, no LOB noted.   Pt. Standing at sink with CGA for balance while performing grooming task (brushing teeth) with appropriate preparation and setup noted.  Pt. Left in room with PTA, all needs within reach.          Therapeutic Positioning    OT interventions performed during  the course of today's session in an effort to prevent and/or reduce acquired pressure injuries:   Education was provided on benefits of and recommendations for therapeutic positioning        [facility] 6 Click ADL:      Patient Education:  Patient provided with verbal education education regarding fall prevention and safety awareness.  Understanding was verbalized.      Patient left ambulatory in room/cisse with all lines intact and call button in reach    GOALS:   Multidisciplinary Problems       Occupational Therapy Goals          Problem: Occupational Therapy    Goal Priority Disciplines Outcome Interventions   Occupational Therapy Goal     OT, PT/OT Ongoing, Progressing    Description: Goals to be met by: 12/12/23     Patient will increase functional independence with ADLs by performing:    Feeding with [place].  UE Dressing with [place].  LE Dressing with [place].  Grooming while standing at sink with [place].  Toileting from toilet with [place] for hygiene and clothing management.   Toilet transfer to toilet with [place].  Increased functional strength to 5/5 for RUE grossly to improve functional use of R dominant UE.  Pt will perform RUE theraband HEP 2x10 reps to improve RUE strength and coordination.                         Time Tracking:     OT Date of Treatment: 11/13/23  OT Start Time: 1050  OT Stop Time: 1107  OT Total Time (min): 17 min    Billable Minutes:Self Care/Home Management 1    OT/MISBAH: MISBAH     Number of MISBAH visits since last OT visit: 1    11/13/2023

## 2023-11-14 LAB
ALBUMIN SERPL-MCNC: 3.4 G/DL (ref 3.5–5)
ALBUMIN/GLOB SERPL: 1 RATIO (ref 1.1–2)
ALP SERPL-CCNC: 71 UNIT/L (ref 40–150)
ALT SERPL-CCNC: 14 UNIT/L (ref 0–55)
AST SERPL-CCNC: 16 UNIT/L (ref 5–34)
BASOPHILS # BLD AUTO: 0.01 X10(3)/MCL
BASOPHILS NFR BLD AUTO: 0.2 %
BILIRUB SERPL-MCNC: 0.2 MG/DL
BUN SERPL-MCNC: 7.2 MG/DL (ref 7–18.7)
CALCIUM SERPL-MCNC: 8.7 MG/DL (ref 8.4–10.2)
CHLORIDE SERPL-SCNC: 109 MMOL/L (ref 98–107)
CO2 SERPL-SCNC: 22 MMOL/L (ref 22–29)
CREAT SERPL-MCNC: 0.68 MG/DL (ref 0.55–1.02)
EOSINOPHIL # BLD AUTO: 0.27 X10(3)/MCL (ref 0–0.9)
EOSINOPHIL NFR BLD AUTO: 5.6 %
ERYTHROCYTE [DISTWIDTH] IN BLOOD BY AUTOMATED COUNT: 15.4 % (ref 11.5–17)
GFR SERPLBLD CREATININE-BSD FMLA CKD-EPI: >60 MLS/MIN/1.73/M2
GLOBULIN SER-MCNC: 3.5 GM/DL (ref 2.4–3.5)
GLUCOSE SERPL-MCNC: 101 MG/DL (ref 74–100)
HCT VFR BLD AUTO: 32.2 % (ref 37–47)
HGB BLD-MCNC: 10.2 G/DL (ref 12–16)
IMM GRANULOCYTES # BLD AUTO: 0.01 X10(3)/MCL (ref 0–0.04)
IMM GRANULOCYTES NFR BLD AUTO: 0.2 %
LYMPHOCYTES # BLD AUTO: 1.02 X10(3)/MCL (ref 0.6–4.6)
LYMPHOCYTES NFR BLD AUTO: 21.1 %
MCH RBC QN AUTO: 24.8 PG (ref 27–31)
MCHC RBC AUTO-ENTMCNC: 31.7 G/DL (ref 33–36)
MCV RBC AUTO: 78.3 FL (ref 80–94)
MONOCYTES # BLD AUTO: 0.34 X10(3)/MCL (ref 0.1–1.3)
MONOCYTES NFR BLD AUTO: 7 %
NEUTROPHILS # BLD AUTO: 3.19 X10(3)/MCL (ref 2.1–9.2)
NEUTROPHILS NFR BLD AUTO: 65.9 %
NRBC BLD AUTO-RTO: 0 %
PLATELET # BLD AUTO: 215 X10(3)/MCL (ref 130–400)
PMV BLD AUTO: 10.2 FL (ref 7.4–10.4)
POTASSIUM SERPL-SCNC: 3.8 MMOL/L (ref 3.5–5.1)
PROT SERPL-MCNC: 6.9 GM/DL (ref 6.4–8.3)
RBC # BLD AUTO: 4.11 X10(6)/MCL (ref 4.2–5.4)
SODIUM SERPL-SCNC: 138 MMOL/L (ref 136–145)
WBC # SPEC AUTO: 4.84 X10(3)/MCL (ref 4.5–11.5)

## 2023-11-14 PROCEDURE — 25000003 PHARM REV CODE 250: Performed by: INTERNAL MEDICINE

## 2023-11-14 PROCEDURE — 97116 GAIT TRAINING THERAPY: CPT

## 2023-11-14 PROCEDURE — 85025 COMPLETE CBC W/AUTO DIFF WBC: CPT | Performed by: INTERNAL MEDICINE

## 2023-11-14 PROCEDURE — 11000001 HC ACUTE MED/SURG PRIVATE ROOM

## 2023-11-14 PROCEDURE — 97110 THERAPEUTIC EXERCISES: CPT

## 2023-11-14 PROCEDURE — 21400001 HC TELEMETRY ROOM

## 2023-11-14 PROCEDURE — 80053 COMPREHEN METABOLIC PANEL: CPT | Performed by: INTERNAL MEDICINE

## 2023-11-14 PROCEDURE — 25000003 PHARM REV CODE 250: Performed by: STUDENT IN AN ORGANIZED HEALTH CARE EDUCATION/TRAINING PROGRAM

## 2023-11-14 PROCEDURE — 25000003 PHARM REV CODE 250: Performed by: NURSE PRACTITIONER

## 2023-11-14 RX ADMIN — LABETALOL HYDROCHLORIDE 100 MG: 100 TABLET, FILM COATED ORAL at 10:11

## 2023-11-14 RX ADMIN — MUPIROCIN: 20 OINTMENT TOPICAL at 09:11

## 2023-11-14 RX ADMIN — AMLODIPINE BESYLATE 10 MG: 5 TABLET ORAL at 09:11

## 2023-11-14 RX ADMIN — HYDROCODONE BITARTRATE AND ACETAMINOPHEN 1 TABLET: 10; 325 TABLET ORAL at 06:11

## 2023-11-14 RX ADMIN — LABETALOL HYDROCHLORIDE 100 MG: 100 TABLET, FILM COATED ORAL at 09:11

## 2023-11-14 RX ADMIN — HYDROCODONE BITARTRATE AND ACETAMINOPHEN 1 TABLET: 10; 325 TABLET ORAL at 12:11

## 2023-11-14 RX ADMIN — ASPIRIN 81 MG: 81 TABLET, COATED ORAL at 09:11

## 2023-11-14 NOTE — PT/OT/SLP PROGRESS
Physical Therapy Treatment    Patient Name:  Trish Hale   MRN:  [id number]    Recommendations:     Discharge therapy intensity: High Intensity Therapy   Discharge Equipment Recommendations: to be determined by next level of care  Barriers to discharge: Impaired mobility, decreased caregiver support    Assessment:     Trish Hale is a 28 y.o. female admitted with a medical diagnosis of CVA, hypokalemia .  She presents with the following impairments/functional limitations: weakness, impaired endurance, impaired self care skills, impaired functional mobility, gait instability, impaired balance, decreased coordination. At baseline pt was an independent, active parent of 4 young children. She did not require AD for mobility, and denied stability or falls prior to admission. At time of session, pt was dependent upon RW for mobility requiring CGA with ongoing unsteady gait pattern with narrow STEPHANIE, decreased knee stability in stance phase, and unequal step length. PT attempted mobility without use of AD and pt max A to pre-gait activities of marching in place. Due to prior level of independence, severity of strength and coordination deficits, along with decreased CG support at home, pt would benefit from high intensity rehab services for greatest potential to reach PLOF.    Rehab Prognosis: Good; patient would benefit from acute skilled PT services to address these deficits and reach maximum level of function.    Recent Surgery: * No surgery found *      Plan:     During this hospitalization, patient to be seen 6 x/week to address the identified rehab impairments via gait training, therapeutic activities, therapeutic exercises, neuromuscular re-education and progress toward the following goals:    Plan of Care Expires:  12/10/23    Subjective     Chief Complaint: abdominal discomfort and fatigue  Patient/Family Comments/goals: to get back home to her kids.  Pain/Comfort:  Pain Rating 1: 5/10  Location 1: abdomen  (menstrual cramping)      Objective:     Communicated with Nurse prior to session.  Patient found left sidelying with peripheral IV upon PT entry to room.     General Precautions: Standard, fall  Orthopedic Precautions: N/A  Braces: N/A  Respiratory Status: Room air  Blood Pressure: 156/107  Skin Integrity: Visible skin intact      Functional Mobility:  Bed Mobility:     Supine to Sit: CGA.   Transfers:     Sit to Stand:  contact guard assistance with rolling walker, mod A without use of AD or Ues. Pt unanable to complete 5 sec sit<>stand assessment. For pt's age would anticipate test to be completed in <10 sec.  Gait: 50ft with RW, CGA. Moderate gait deviations with narrow STEPHANIE, unequal step length, poor stability in stance phase with occasional knee buckling (R>L). Increased lateral pelvic sway also observed with pt easily fatigued with exertion.   Attempted gait training without AD but pt unable to safely perform lateral weight shifting and marching in place without max A. Not appropriate for gait training without AD at this time.    Therapeutic Activities/Exercises:  Sit<>stand transfers without use of Ues- PT assist with verbal cues for sequencing to improve LE engagement during exercise  Seated hip flexion x 10 reps each LE  Standing hip abduction with UE support x 10 reps each LE    Education:  Patient provided with verbal education education regarding POC.  Understanding was verbalized, however additional teaching warranted.     Patient left sitting edge of bed with call button in reach and chair alarm on..    GOALS:   Multidisciplinary Problems       Physical Therapy Goals          Problem: Physical Therapy    Goal Priority Disciplines Outcome Goal Variances Interventions   Physical Therapy Goal     PT, PT/OT Ongoing, Progressing     Description: Goals to be met by: 12/10/23     Patient will increase functional independence with mobility by performin. Supine to sit with Stand-by Assistance- MET  2.  Sit to supine with Stand-by Assistance- MET  3. Sit to stand transfer with Stand-by Assistance  4. Bed to chair transfer with Stand-by Assistance using Rolling Walker  5. Gait  x 150 feet with Stand-by Assistance using Rolling Walker.   6. Pt Ind with bed mobility                         Time Tracking:     PT Received On: 11/14/23  PT Start Time: 1116     PT Stop Time: 1141  PT Total Time (min): 25 min     Billable Minutes: Gait Training 10 min and Therapeutic Exercise 15 min    Treatment Type: Treatment  PT/PTA: PT     Number of PTA visits since last PT visit: 2     11/14/2023

## 2023-11-14 NOTE — PROGRESS NOTES
[facility] - 7th Floor [facility] Medicine  Progress Note    Patient Name: Trish Hale  MRN: [id number]  Patient Class: IP- Inpatient   Admission Date: 11/10/2023  Length of Stay: 4 days  Attending Physician: Jaylen Kaur MD  Primary Care Provider: Wendy Babin MD        Subjective:     Principal Problem:<principal problem not specified>    Interval History:   Today's info : seen and examined, no acute events overnight. Continues to improve   Bp improving  Headache better  Mastoid cyst on ct  Mri unremarkable    Review of Systems   Constitutional: Negative.    HENT: Negative.     Eyes: Negative.    Respiratory: Negative.     Cardiovascular: Negative.    Gastrointestinal: Negative.    Endocrine: Negative.    Genitourinary: Negative.    Musculoskeletal: Negative.    Skin: Negative.    Allergic/Immunologic: Negative.  Food allergies: paramjithena.   Neurological:  Positive for dizziness, weakness and headaches.   Hematological: Negative.    Psychiatric/Behavioral: Negative.       Objective:     Vital Signs (Most Recent):  Temp: 97.9 °F (36.6 °C) (11/14/23 1554)  Pulse: 81 (11/14/23 1554)  Resp: 18 (11/14/23 1554)  BP: (!) 143/88 (11/14/23 1554)  SpO2: 97 % (11/14/23 1554) Vital Signs (24h Range):  Temp:  [97.8 °F (36.6 °C)-98.9 °F (37.2 °C)] 97.9 °F (36.6 °C)  Pulse:  [70-87] 81  Resp:  [10-18] 18  SpO2:  [97 %-99 %] 97 %  BP: (138-163)/() 143/88     Weight: 111.1 kg (244 lb 14.9 oz)  Body mass index is 38.35 kg/m².  No intake or output data in the 24 hours ending 11/14/23 1622     Physical Exam  Vitals reviewed.   Constitutional:       Appearance: Normal appearance.   HENT:      Head: Normocephalic and atraumatic.      Right Ear: Tympanic membrane and external ear normal.      Nose: Nose normal.      Mouth/Throat:      Mouth: Mucous membranes are moist.   Eyes:      Extraocular Movements: Extraocular movements intact.      Pupils: Pupils are equal, round, and reactive to  light.   Cardiovascular:      Rate and Rhythm: Normal rate and regular rhythm.      Pulses: Normal pulses.      Heart sounds: Normal heart sounds.   Pulmonary:      Effort: Pulmonary effort is normal.      Breath sounds: Normal breath sounds.   Abdominal:      General: Abdomen is flat. Bowel sounds are normal.      Palpations: Abdomen is soft.   Musculoskeletal:         General: Normal range of motion.      Cervical back: Normal range of motion and neck supple.   Skin:     General: Skin is warm and dry.   Neurological:      General: No focal deficit present.      Mental Status: She is alert and oriented to person, place, and time.   Psychiatric:         Mood and Affect: Mood normal.         Behavior: Behavior normal.           Overview/Hospital Course: stable    Significant Labs: All pertinent labs within the past 24 hours have been reviewed.  Lab Results   Component Value Date    WBC 4.84 11/14/2023    HGB 10.2 (L) 11/14/2023    HCT 32.2 (L) 11/14/2023    MCV 78.3 (L) 11/14/2023     11/14/2023         Recent Labs   Lab 11/14/23  0603      K 3.8   CO2 22   BUN 7.2   CREATININE 0.68   GLUCOSE 101*   CALCIUM 8.7         Significant Imaging: I have reviewed all pertinent imaging results/findings within the past 24 hours.    Assessment/Plan:      Active Diagnoses:    Diagnosis Date Noted POA    Stroke [I63.9] 11/11/2023 Yes      Problems Resolved During this Admission:     VTE Risk Mitigation (From admission, onward)           Ordered     IP VTE HIGH RISK PATIENT  Once         11/11/23 0813     Place sequential compression device  Until discontinued         11/11/23 0813                    Acute CVA status post TNK   Hypertensive emergency  Tobacco use  Normocytic anemia, stable      Plan:  Neuro reccs appreciate  Add norco for headache  Pt//ot  Cm for rehab    Jaylen Kaur MD  Department of Hospital Medicine   [facility] - 7th Floor ICU

## 2023-11-14 NOTE — PLAN OF CARE
Patient seen by PT. Pt is recommending High Intensity. Rehab choices printed via care port by CM then given to patient  and mother. Patient's first choice is [facility]. Rehab referral sent by CM via care port to [facility] rehab. Patient's second choice is [facility]

## 2023-11-14 NOTE — PLAN OF CARE
Problem: Physical Therapy  Goal: Physical Therapy Goal  Description: Goals to be met by: 12/10/23     Patient will increase functional independence with mobility by performin. Supine to sit with Stand-by Assistance- MET  2. Sit to supine with Stand-by Assistance- MET  3. Sit to stand transfer with Stand-by Assistance  4. Bed to chair transfer with Stand-by Assistance using Rolling Walker  5. Gait  x 150 feet with Stand-by Assistance using Rolling Walker.   6. Pt Ind with bed mobility    Outcome: Ongoing, Progressing   Pt is progressing well but anticipate that due to age and prior level of independence, pt would be able to progress back to baseline with high intensity therapy services.

## 2023-11-15 PROCEDURE — 25000003 PHARM REV CODE 250: Performed by: INTERNAL MEDICINE

## 2023-11-15 PROCEDURE — 25000003 PHARM REV CODE 250: Performed by: NURSE PRACTITIONER

## 2023-11-15 PROCEDURE — 11000001 HC ACUTE MED/SURG PRIVATE ROOM

## 2023-11-15 PROCEDURE — 21400001 HC TELEMETRY ROOM

## 2023-11-15 PROCEDURE — 97116 GAIT TRAINING THERAPY: CPT | Mod: CQ

## 2023-11-15 PROCEDURE — 25000003 PHARM REV CODE 250: Performed by: STUDENT IN AN ORGANIZED HEALTH CARE EDUCATION/TRAINING PROGRAM

## 2023-11-15 RX ORDER — HYDROCODONE BITARTRATE AND ACETAMINOPHEN 5; 325 MG/1; MG/1
1 TABLET ORAL EVERY 4 HOURS PRN
Status: DISCONTINUED | OUTPATIENT
Start: 2023-11-15 | End: 2023-11-20 | Stop reason: HOSPADM

## 2023-11-15 RX ORDER — AMOXICILLIN AND CLAVULANATE POTASSIUM 875; 125 MG/1; MG/1
1 TABLET, FILM COATED ORAL EVERY 12 HOURS
Status: DISCONTINUED | OUTPATIENT
Start: 2023-11-15 | End: 2023-11-20 | Stop reason: HOSPADM

## 2023-11-15 RX ADMIN — LABETALOL HYDROCHLORIDE 100 MG: 100 TABLET, FILM COATED ORAL at 08:11

## 2023-11-15 RX ADMIN — ASPIRIN 81 MG: 81 TABLET, COATED ORAL at 08:11

## 2023-11-15 RX ADMIN — HYDROCODONE BITARTRATE AND ACETAMINOPHEN 1 TABLET: 5; 325 TABLET ORAL at 11:11

## 2023-11-15 RX ADMIN — HYDROCODONE BITARTRATE AND ACETAMINOPHEN 1 TABLET: 5; 325 TABLET ORAL at 09:11

## 2023-11-15 RX ADMIN — AMOXICILLIN AND CLAVULANATE POTASSIUM 1 TABLET: 875; 125 TABLET, FILM COATED ORAL at 08:11

## 2023-11-15 RX ADMIN — AMLODIPINE BESYLATE 10 MG: 5 TABLET ORAL at 08:11

## 2023-11-15 RX ADMIN — MUPIROCIN: 20 OINTMENT TOPICAL at 08:11

## 2023-11-15 NOTE — NURSING
Pt is asking to receive antibiotics for her cracked tooth abscess due to the pain its causing her.     Will ask the MD when he rounds.

## 2023-11-15 NOTE — PT/OT/SLP PROGRESS
Physical Therapy Treatment    Patient Name:  Trish Hale   MRN:  [id number]    Recommendations:     Discharge therapy intensity: High Intensity Therapy   Discharge Equipment Recommendations: to be determined by next level of care  Barriers to discharge: Decreased caregiver support and Impaired mobility    Assessment:     Trish Hale is a 28 y.o. female admitted with a medical diagnosis of <principal problem not specified>.  She presents with the following impairments/functional limitations: weakness, impaired endurance, decreased coordination, decreased lower extremity function, impaired self care skills, impaired functional mobility, gait instability, impaired balance .    Rehab Prognosis: Good; patient would benefit from acute skilled PT services to address these deficits and reach maximum level of function.    Recent Surgery: * No surgery found *      Plan:     During this hospitalization, patient to be seen 6 x/week to address the identified rehab impairments via gait training, therapeutic activities and progress toward the following goals:    Plan of Care Expires:  12/10/23    Subjective     Chief Complaint: I am tired  Patient/Family Comments/goals:   Pain/Comfort:         Objective:     Communicated with nurse prior to session.  Patient found HOB elevated with peripheral IV, telemetry upon PT entry to room.     General Precautions: Standard, fall  Orthopedic Precautions: N/A  Braces: N/A  Respiratory Status: Room air  Blood Pressure: 138/91  Skin Integrity: Visible skin intact      Functional Mobility:  Bed Mobility:     Supine to Sit: contact guard assistance  Transfers:     Sit to Stand:  contact guard assistance with rolling walker  Gait: pt amb x70ft with RW and CGA with some R knee unsteadiness but no buckling noted.     Therapeutic Activities/Exercises:  Pt performed standing R hip flexion x10 reps with RW and  with some noted coordination difficulties requiring cues to complete.   Pt performed  standing calf raises x10 reps with RW; no unsteadiness noted    Education:  Patient provided with verbal education education regarding safety awareness.  Understanding was verbalized.     Patient left sitting edge of bed with all lines intact and call button in reach..    GOALS:   Multidisciplinary Problems       Physical Therapy Goals          Problem: Physical Therapy    Goal Priority Disciplines Outcome Goal Variances Interventions   Physical Therapy Goal     PT, PT/OT Ongoing, Progressing     Description: Goals to be met by: 12/10/23     Patient will increase functional independence with mobility by performin. Supine to sit with Stand-by Assistance- MET  2. Sit to supine with Stand-by Assistance- MET  3. Sit to stand transfer with Stand-by Assistance  4. Bed to chair transfer with Stand-by Assistance using Rolling Walker  5. Gait  x 150 feet with Stand-by Assistance using Rolling Walker.   6. Pt Ind with bed mobility                         Time Tracking:     PT Received On: 11/15/23  PT Start Time: 1009     PT Stop Time: 1025  PT Total Time (min): 16 min     Billable Minutes: Gait Training 16    Treatment Type: Treatment  PT/PTA: PTA     Number of PTA visits since last PT visit: 3     11/15/2023

## 2023-11-15 NOTE — NURSING
Pt med rec.    Home meds at bedside from 2/2021  Label was very hard to read and original med, pt stated she forgot to take them and never got them refilled.   States she followed up with Dr York last year     Meds were labet 200mg and amlodip 10mg  Not added to home meds due to 2 years of not taking.

## 2023-11-15 NOTE — PROGRESS NOTES
[facility] - 7th Floor [facility] Medicine  Progress Note    Patient Name: Trish Hale  MRN: [id number]  Patient Class: IP- Inpatient   Admission Date: 11/10/2023  Length of Stay: 5 days  Attending Physician: Jaylen Kaur MD  Primary Care Provider: Wendy Babin MD        Subjective:     Principal Problem:<principal problem not specified>    Interval History:   Today's info : seen and examined, no acute events overnight. Continues to improve   Bp improving  Headache better  Mastoid cyst on ct  Mri unremarkable  Tooth infection reported    Review of Systems   Constitutional: Negative.    HENT: Negative.     Eyes: Negative.    Respiratory: Negative.     Cardiovascular: Negative.    Gastrointestinal: Negative.    Endocrine: Negative.    Genitourinary: Negative.    Musculoskeletal: Negative.    Skin: Negative.    Allergic/Immunologic: Negative.  Food allergies: manthena.   Neurological:  Positive for dizziness, weakness and headaches.   Hematological: Negative.    Psychiatric/Behavioral: Negative.       Objective:     Vital Signs (Most Recent):  Temp: 98.5 °F (36.9 °C) (11/15/23 1528)  Pulse: 82 (11/15/23 1528)  Resp: 18 (11/15/23 1528)  BP: 132/89 (11/15/23 1528)  SpO2: 98 % (11/15/23 1528) Vital Signs (24h Range):  Temp:  [97.7 °F (36.5 °C)-98.5 °F (36.9 °C)] 98.5 °F (36.9 °C)  Pulse:  [75-87] 82  Resp:  [18] 18  SpO2:  [97 %-99 %] 98 %  BP: (132-155)/(82-99) 132/89     Weight: 111.1 kg (244 lb 14.9 oz)  Body mass index is 38.35 kg/m².  No intake or output data in the 24 hours ending 11/15/23 1653     Physical Exam  Vitals reviewed.   Constitutional:       Appearance: Normal appearance.   HENT:      Head: Normocephalic and atraumatic.      Right Ear: Tympanic membrane and external ear normal.      Nose: Nose normal.      Mouth/Throat:      Mouth: Mucous membranes are moist.   Eyes:      Extraocular Movements: Extraocular movements intact.      Pupils: Pupils are equal, round,  and reactive to light.   Cardiovascular:      Rate and Rhythm: Normal rate and regular rhythm.      Pulses: Normal pulses.      Heart sounds: Normal heart sounds.   Pulmonary:      Effort: Pulmonary effort is normal.      Breath sounds: Normal breath sounds.   Abdominal:      General: Abdomen is flat. Bowel sounds are normal.      Palpations: Abdomen is soft.   Musculoskeletal:         General: Normal range of motion.      Cervical back: Normal range of motion and neck supple.   Skin:     General: Skin is warm and dry.   Neurological:      General: No focal deficit present.      Mental Status: She is alert and oriented to person, place, and time.   Psychiatric:         Mood and Affect: Mood normal.         Behavior: Behavior normal.           Overview/Hospital Course: stable    Significant Labs: All pertinent labs within the past 24 hours have been reviewed.  Lab Results   Component Value Date    WBC 4.84 11/14/2023    HGB 10.2 (L) 11/14/2023    HCT 32.2 (L) 11/14/2023    MCV 78.3 (L) 11/14/2023     11/14/2023         Recent Labs   Lab 11/14/23  0603      K 3.8   CO2 22   BUN 7.2   CREATININE 0.68   GLUCOSE 101*   CALCIUM 8.7         Significant Imaging: I have reviewed all pertinent imaging results/findings within the past 24 hours.    Assessment/Plan:      Active Diagnoses:    Diagnosis Date Noted POA    Stroke [I63.9] 11/11/2023 Yes      Problems Resolved During this Admission:     VTE Risk Mitigation (From admission, onward)           Ordered     IP VTE HIGH RISK PATIENT  Once         11/11/23 0813     Place sequential compression device  Until discontinued         11/11/23 0813                    Acute CVA status post TNK   Hypertensive emergency  Tobacco use  Normocytic anemia, stable      Plan:  Neuro reccs appreciate  Add norco for headache  Pt//ot  Cm for rehab  Add augmentin po    Jaylen Kaur MD  Department of Hospital Medicine   [facility] - 7th Floor ICU

## 2023-11-15 NOTE — PLAN OF CARE
Insurance denied rehab on Ms Trish. They said she can have a lower level of care . If Dr York  wants to do a P2P ,call [phone] within 3 business days to schedule Message sent through secure chat to notify Dr York of this ..

## 2023-11-16 LAB
ALBUMIN SERPL-MCNC: 3 G/DL (ref 3.5–5)
ALBUMIN/GLOB SERPL: 0.9 RATIO (ref 1.1–2)
ALP SERPL-CCNC: 57 UNIT/L (ref 40–150)
ALT SERPL-CCNC: 30 UNIT/L (ref 0–55)
APPEARANCE UR: CLEAR
AST SERPL-CCNC: 29 UNIT/L (ref 5–34)
BACTERIA #/AREA URNS AUTO: ABNORMAL /HPF
BASOPHILS # BLD AUTO: 0.03 X10(3)/MCL
BASOPHILS NFR BLD AUTO: 0.2 %
BILIRUB SERPL-MCNC: 0.4 MG/DL
BILIRUB UR QL STRIP.AUTO: NEGATIVE
BUN SERPL-MCNC: 10.2 MG/DL (ref 7–18.7)
CALCIUM SERPL-MCNC: 8.6 MG/DL (ref 8.4–10.2)
CHLORIDE SERPL-SCNC: 107 MMOL/L (ref 98–107)
CO2 SERPL-SCNC: 21 MMOL/L (ref 22–29)
COLOR UR AUTO: ABNORMAL
CREAT SERPL-MCNC: 0.78 MG/DL (ref 0.55–1.02)
EOSINOPHIL # BLD AUTO: 0.02 X10(3)/MCL (ref 0–0.9)
EOSINOPHIL NFR BLD AUTO: 0.2 %
ERYTHROCYTE [DISTWIDTH] IN BLOOD BY AUTOMATED COUNT: 15.2 % (ref 11.5–17)
GFR SERPLBLD CREATININE-BSD FMLA CKD-EPI: >60 MLS/MIN/1.73/M2
GLOBULIN SER-MCNC: 3.3 GM/DL (ref 2.4–3.5)
GLUCOSE SERPL-MCNC: 133 MG/DL (ref 74–100)
GLUCOSE UR QL STRIP.AUTO: NORMAL
HCT VFR BLD AUTO: 28.8 % (ref 37–47)
HGB BLD-MCNC: 9.2 G/DL (ref 12–16)
IMM GRANULOCYTES # BLD AUTO: 0.06 X10(3)/MCL (ref 0–0.04)
IMM GRANULOCYTES NFR BLD AUTO: 0.5 %
KETONES UR QL STRIP.AUTO: NEGATIVE
LEUKOCYTE ESTERASE UR QL STRIP.AUTO: NEGATIVE
LYMPHOCYTES # BLD AUTO: 0.17 X10(3)/MCL (ref 0.6–4.6)
LYMPHOCYTES NFR BLD AUTO: 1.3 %
MAGNESIUM SERPL-MCNC: 1 MG/DL (ref 1.6–2.6)
MCH RBC QN AUTO: 24.9 PG (ref 27–31)
MCHC RBC AUTO-ENTMCNC: 31.9 G/DL (ref 33–36)
MCV RBC AUTO: 78 FL (ref 80–94)
MONOCYTES # BLD AUTO: 0.09 X10(3)/MCL (ref 0.1–1.3)
MONOCYTES NFR BLD AUTO: 0.7 %
MUCOUS THREADS URNS QL MICRO: ABNORMAL /LPF
NEUTROPHILS # BLD AUTO: 12.35 X10(3)/MCL (ref 2.1–9.2)
NEUTROPHILS NFR BLD AUTO: 97.1 %
NITRITE UR QL STRIP.AUTO: NEGATIVE
NRBC BLD AUTO-RTO: 0 %
PH UR STRIP.AUTO: 5.5 [PH]
PLATELET # BLD AUTO: 179 X10(3)/MCL (ref 130–400)
PMV BLD AUTO: 10.1 FL (ref 7.4–10.4)
POTASSIUM SERPL-SCNC: 3.1 MMOL/L (ref 3.5–5.1)
PROT SERPL-MCNC: 6.3 GM/DL (ref 6.4–8.3)
PROT UR QL STRIP.AUTO: NEGATIVE
RBC # BLD AUTO: 3.69 X10(6)/MCL (ref 4.2–5.4)
RBC #/AREA URNS AUTO: ABNORMAL /HPF
RBC UR QL AUTO: ABNORMAL
SODIUM SERPL-SCNC: 137 MMOL/L (ref 136–145)
SP GR UR STRIP.AUTO: 1.01 (ref 1–1.03)
SQUAMOUS #/AREA URNS LPF: ABNORMAL /HPF
UROBILINOGEN UR STRIP-ACNC: NORMAL
WBC # SPEC AUTO: 12.72 X10(3)/MCL (ref 4.5–11.5)
WBC #/AREA URNS AUTO: ABNORMAL /HPF

## 2023-11-16 PROCEDURE — 81001 URINALYSIS AUTO W/SCOPE: CPT | Performed by: NURSE PRACTITIONER

## 2023-11-16 PROCEDURE — 85025 COMPLETE CBC W/AUTO DIFF WBC: CPT | Performed by: NURSE PRACTITIONER

## 2023-11-16 PROCEDURE — 25000003 PHARM REV CODE 250: Performed by: NURSE PRACTITIONER

## 2023-11-16 PROCEDURE — 97535 SELF CARE MNGMENT TRAINING: CPT | Mod: CO

## 2023-11-16 PROCEDURE — 25000003 PHARM REV CODE 250: Performed by: INTERNAL MEDICINE

## 2023-11-16 PROCEDURE — 83735 ASSAY OF MAGNESIUM: CPT | Performed by: NURSE PRACTITIONER

## 2023-11-16 PROCEDURE — 21400001 HC TELEMETRY ROOM

## 2023-11-16 PROCEDURE — 97116 GAIT TRAINING THERAPY: CPT | Mod: CQ

## 2023-11-16 PROCEDURE — 80053 COMPREHEN METABOLIC PANEL: CPT | Performed by: NURSE PRACTITIONER

## 2023-11-16 PROCEDURE — 25000003 PHARM REV CODE 250: Performed by: STUDENT IN AN ORGANIZED HEALTH CARE EDUCATION/TRAINING PROGRAM

## 2023-11-16 RX ORDER — ONDANSETRON 4 MG/1
8 TABLET, ORALLY DISINTEGRATING ORAL EVERY 6 HOURS PRN
Status: DISCONTINUED | OUTPATIENT
Start: 2023-11-16 | End: 2023-11-20 | Stop reason: HOSPADM

## 2023-11-16 RX ORDER — SODIUM CHLORIDE 9 MG/ML
INJECTION, SOLUTION INTRAVENOUS CONTINUOUS
Status: DISCONTINUED | OUTPATIENT
Start: 2023-11-16 | End: 2023-11-20 | Stop reason: HOSPADM

## 2023-11-16 RX ADMIN — AMLODIPINE BESYLATE 10 MG: 5 TABLET ORAL at 09:11

## 2023-11-16 RX ADMIN — ASPIRIN 81 MG: 81 TABLET, COATED ORAL at 09:11

## 2023-11-16 RX ADMIN — AMOXICILLIN AND CLAVULANATE POTASSIUM 1 TABLET: 875; 125 TABLET, FILM COATED ORAL at 09:11

## 2023-11-16 RX ADMIN — HYDROCODONE BITARTRATE AND ACETAMINOPHEN 1 TABLET: 5; 325 TABLET ORAL at 04:11

## 2023-11-16 RX ADMIN — ONDANSETRON 8 MG: 4 TABLET, ORALLY DISINTEGRATING ORAL at 05:11

## 2023-11-16 RX ADMIN — LABETALOL HYDROCHLORIDE 100 MG: 100 TABLET, FILM COATED ORAL at 09:11

## 2023-11-16 RX ADMIN — SODIUM CHLORIDE: 9 INJECTION, SOLUTION INTRAVENOUS at 08:11

## 2023-11-16 RX ADMIN — ACETAMINOPHEN 325MG 650 MG: 325 TABLET ORAL at 07:11

## 2023-11-16 NOTE — PT/OT/SLP PROGRESS
Physical Therapy Treatment    Patient Name:  Trish Hale   MRN:  [id number]    Recommendations:     Discharge therapy intensity: High Intensity Therapy   Discharge Equipment Recommendations: to be determined by next level of care  Barriers to discharge: Decreased caregiver support    Assessment:     Trish Hale is a 28 y.o. female admitted with a medical diagnosis of <principal problem not specified>.  She presents with the following impairments/functional limitations: weakness, decreased lower extremity function, gait instability, impaired balance, impaired self care skills, impaired functional mobility .    Rehab Prognosis: Good; patient would benefit from acute skilled PT services to address these deficits and reach maximum level of function.    Recent Surgery: * No surgery found *      Plan:     During this hospitalization, patient to be seen 6 x/week to address the identified rehab impairments via gait training, therapeutic activities and progress toward the following goals:    Plan of Care Expires:  12/10/23    Subjective     Chief Complaint:   Patient/Family Comments/goals:   Pain/Comfort:         Objective:     Communicated with nurse prior to session.  Patient found up in chair with peripheral IV, pulse ox (continuous) upon PT entry to room.     General Precautions: Standard, fall  Orthopedic Precautions: N/A  Braces: N/A  Respiratory Status: Room air  Blood Pressure: 131/82  Skin Integrity: Visible skin intact      Functional Mobility:  Transfers:     Sit to Stand:  contact guard assistance with rolling walker  Gait: Pt amb x60ft with RW and CGA with noted decreased R knee instability in stance phase    Therapeutic Activities/Exercises:  Pt instructed in standing therex including standing R hip flexion, calf raises and standing hip flexion x10 reps each with RW for UE support; noted decreased instability in stance phase and increased coordination.     Education:  Patient provided with verbal education  education regarding safety awareness.  Understanding was verbalized, however additional teaching warranted.     Patient left up in chair with all lines intact and call button in reach..    GOALS:   Multidisciplinary Problems       Physical Therapy Goals          Problem: Physical Therapy    Goal Priority Disciplines Outcome Goal Variances Interventions   Physical Therapy Goal     PT, PT/OT Ongoing, Progressing     Description: Goals to be met by: 12/10/23     Patient will increase functional independence with mobility by performin. Supine to sit with Stand-by Assistance- MET  2. Sit to supine with Stand-by Assistance- MET  3. Sit to stand transfer with Stand-by Assistance  4. Bed to chair transfer with Stand-by Assistance using Rolling Walker  5. Gait  x 150 feet with Stand-by Assistance using Rolling Walker.   6. Pt Ind with bed mobility                         Time Tracking:     PT Received On: 23  PT Start Time: 856     PT Stop Time: 911  PT Total Time (min): 15 min     Billable Minutes: Gait Training 15    Treatment Type: Treatment  PT/PTA: PTA     Number of PTA visits since last PT visit: 4     2023

## 2023-11-16 NOTE — PLAN OF CARE
Problem: Adult Inpatient Plan of Care  Goal: Plan of Care Review  Outcome: Ongoing, Progressing  Goal: Patient-Specific Goal (Individualized)  Outcome: Ongoing, Progressing  Goal: Absence of Hospital-Acquired Illness or Injury  Outcome: Ongoing, Progressing  Goal: Optimal Comfort and Wellbeing  Outcome: Ongoing, Progressing  Goal: Readiness for Transition of Care  Outcome: Ongoing, Progressing     Problem: Skin Injury Risk Increased  Goal: Skin Health and Integrity  Outcome: Ongoing, Progressing     Problem: Adjustment to Illness (Stroke, Ischemic/Transient Ischemic Attack)  Goal: Optimal Coping  Outcome: Ongoing, Progressing     Problem: Bowel Elimination Impaired (Stroke, Ischemic/Transient Ischemic Attack)  Goal: Effective Bowel Elimination  Outcome: Ongoing, Progressing     Problem: Cerebral Tissue Perfusion (Stroke, Ischemic/Transient Ischemic Attack)  Goal: Optimal Cerebral Tissue Perfusion  Outcome: Ongoing, Progressing     Problem: Cognitive Impairment (Stroke, Ischemic/Transient Ischemic Attack)  Goal: Optimal Cognitive Function  Outcome: Ongoing, Progressing     Problem: Communication Impairment (Stroke, Ischemic/Transient Ischemic Attack)  Goal: Improved Communication Skills  Outcome: Ongoing, Progressing     Problem: Functional Ability Impaired (Stroke, Ischemic/Transient Ischemic Attack)  Goal: Optimal Functional Ability  Outcome: Ongoing, Progressing     Problem: Respiratory Compromise (Stroke, Ischemic/Transient Ischemic Attack)  Goal: Effective Oxygenation and Ventilation  Outcome: Ongoing, Progressing     Problem: Swallowing Impairment (Stroke, Ischemic/Transient Ischemic Attack)  Goal: Optimal Eating and Swallowing without Aspiration  Outcome: Ongoing, Progressing     Problem: Urinary Elimination Impaired (Stroke, Ischemic/Transient Ischemic Attack)  Goal: Effective Urinary Elimination  Outcome: Ongoing, Progressing

## 2023-11-16 NOTE — PROGRESS NOTES
"Inpatient Nutrition Evaluation    Admit Date: 11/10/2023   Total duration of encounter: 6 days    Nutrition Recommendation/Prescription     Continue regular diet as tolerated  RD to monitor po intake and weight    Nutrition Assessment     Chart Review    Reason Seen: length of stay    Malnutrition Screening Tool Results   Have you recently lost weight without trying?: No  Have you been eating poorly because of a decreased appetite?: No   MST Score: 0     Diagnosis:  Acute CVA status post TNK   Hypertensive emergency  Tobacco use  Normocytic anemia, stable     Relevant Medical History: HTN    Nutrition-Related Medications: n/a    Nutrition-Related Labs: n/a      Diet Order: Diet Adult Regular  Oral Supplement Order: none  Appetite/Oral Intake: good/% of meals  Factors Affecting Nutritional Intake: none identified  Food/Temple/Cultural Preferences: none reported  Food Allergies: no known food allergies    Skin Integrity: intact  Wound(s):       Comments    11/16: pt reports good appetite since admission, with fluctuating appetite prior admission for few weeks. Denied need for ONS. Denies GI complaints. Unsure of UBW, stating weight fluctuates. Per EMR weights, possible 8% weight loss in 9 months noted (not significant). Per NFPA, pt well nourished.     Anthropometrics    Height: 5' 7.01" (170.2 cm) Height Method: Measured  Last Weight: 111.1 kg (244 lb 14.9 oz) (11/11/23 0912) Weight Method: Bed Scale  BMI (Calculated): 38.4  BMI Classification: obese grade II (BMI 35-39.9)     Ideal Body Weight (IBW), Female: 135.05 lb     % Ideal Body Weight, Female (lb): 181.43 %                             Usual Weight Provided By: EMR weight history    Wt Readings from Last 5 Encounters:   11/11/23 111.1 kg (244 lb 14.9 oz)   08/22/18 116 kg (255 lb 11.7 oz)     Weight Change(s) Since Admission:  Admit Weight: 111.1 kg (244 lb 14.9 oz) (11/10/23 2204)      Patient Education    Not applicable.    Monitoring & Evaluation "     Dietitian will monitor food and beverage intake and weight.  Nutrition Risk/Follow-Up: low (follow-up in 5-7 days)  Patients assigned 'low nutrition risk' status do not qualify for a full nutritional assessment but will be monitored and re-evaluated in a 5-7 day time period. Please consult if re-evaluation needed sooner.

## 2023-11-16 NOTE — PT/OT/SLP PROGRESS
Occupational Therapy   Treatment    Name: Trish Hale  MRN: [id number]  Admitting Diagnosis:  <principal problem not specified>       Recommendations:     Recommended therapy intensity at discharge: Low Intensity Therapy   Discharge Equipment Recommendations:  to be determined by next level of care  Barriers to discharge:       Assessment:     Trish Hale is a 28 y.o. female with a medical diagnosis of <principal problem not specified>.  She presents pleasant and cooperative, dressed in bed with HOB elevated, Pt. Demonstrating overall increased endurance and improved balance. Performance deficits affecting function are weakness, impaired functional mobility, impaired balance.     Rehab Prognosis:  Good; patient would benefit from acute skilled OT services to address these deficits and reach maximum level of function.       Plan:     Patient to be seen 5 x/week to address the above listed problems via self-care/home management, therapeutic activities, therapeutic exercises  Plan of Care Expires: 12/12/23  Plan of Care Reviewed with: patient    Subjective     Pain/Comfort:  No pain per pt.     Objective:     Communicated with: RN prior to session.  Patient found with bed in chair position with   upon OT entry to room.    General Precautions: Standard, fall    Orthopedic Precautions:   Braces:    Respiratory Status: Room air  Vital Signs: Blood Pressure: 132/65     Occupational Performance:   (Bed Mobility-Independent)   (Sitting balance- Independent)  (Sit to stand- Independent)  Pt. Ambulating from EOB to bathroom with no AD SBA for safety with balance, no LOB, slow eriberto noted.   Pt. Performing grooming task (brushing teeth) independent at sink.   Per patient able to get dressed independently, no concerns with ADLs at this time.   Complaints of slight R UE weakness, thera band given with HEP.      Therapeutic Positioning    OT interventions performed during the course of today's session in an effort to prevent  and/or reduce acquired pressure injuries:   Education was provided on benefits of and recommendations for therapeutic positioning        [facility] 6 Click ADL:      Patient Education:  Patient provided with verbal education education regarding fall prevention.  Understanding was verbalized.      Patient left HOB elevated with all lines intact and call button in reach    GOALS:   Multidisciplinary Problems       Occupational Therapy Goals          Problem: Occupational Therapy    Goal Priority Disciplines Outcome Interventions   Occupational Therapy Goal     OT, PT/OT Ongoing, Progressing    Description: Goals to be met by: 12/12/23     Patient will increase functional independence with ADLs by performing:    Feeding with [place].  UE Dressing with [place].  LE Dressing with [place].  Grooming while standing at sink with [place].  Toileting from toilet with [place] for hygiene and clothing management.   Toilet transfer to toilet with [place].  Increased functional strength to 5/5 for RUE grossly to improve functional use of R dominant UE.  Pt will perform RUE theraband HEP 2x10 reps to improve RUE strength and coordination.                         Time Tracking:     OT Date of Treatment: 11/16/23  OT Start Time: 1454  OT Stop Time: 1505  OT Total Time (min): 11 min    Billable Minutes:Self Care/Home Management 1    OT/MISBAH: MISBAH     Number of MISBAH visits since last OT visit: 2    11/16/2023

## 2023-11-16 NOTE — PROGRESS NOTES
[facility] - 7th Floor [facility] Medicine  Progress Note    Patient Name: Trish Hale  MRN: [id number]  Patient Class: IP- Inpatient   Admission Date: 11/10/2023  Length of Stay: 6 days  Attending Physician: Jaylen Kaur MD  Primary Care Provider: Wendy Babin MD        Subjective:     Principal Problem:<principal problem not specified>    Interval History:   Today's info : seen and examined, no acute events overnight. Continues to improve   Bp improving  Headache better  Mastoid cyst on ct  Mri unremarkable  Tolerating therapy    Review of Systems   Constitutional: Negative.    HENT: Negative.     Eyes: Negative.    Respiratory: Negative.     Cardiovascular: Negative.    Gastrointestinal: Negative.    Endocrine: Negative.    Genitourinary: Negative.    Musculoskeletal: Negative.    Skin: Negative.    Allergic/Immunologic: Negative.  Food allergies: manthena.   Neurological:  Positive for dizziness, weakness and headaches.   Hematological: Negative.    Psychiatric/Behavioral: Negative.       Objective:     Vital Signs (Most Recent):  Temp: 98.1 °F (36.7 °C) (11/16/23 1129)  Pulse: 78 (11/16/23 1129)  Resp: 18 (11/15/23 2103)  BP: 134/80 (11/16/23 1129)  SpO2: 99 % (11/16/23 1129) Vital Signs (24h Range):  Temp:  [97.8 °F (36.6 °C)-98.7 °F (37.1 °C)] 98.1 °F (36.7 °C)  Pulse:  [78-84] 78  Resp:  [18] 18  SpO2:  [98 %-100 %] 99 %  BP: (129-141)/(80-84) 134/80     Weight: 111.1 kg (244 lb 14.9 oz)  Body mass index is 38.35 kg/m².    Intake/Output Summary (Last 24 hours) at 11/16/2023 1536  Last data filed at 11/15/2023 1815  Gross per 24 hour   Intake 0 ml   Output --   Net 0 ml        Physical Exam  Vitals reviewed.   Constitutional:       Appearance: Normal appearance.   HENT:      Head: Normocephalic and atraumatic.      Right Ear: Tympanic membrane and external ear normal.      Nose: Nose normal.      Mouth/Throat:      Mouth: Mucous membranes are moist.   Eyes:       Extraocular Movements: Extraocular movements intact.      Pupils: Pupils are equal, round, and reactive to light.   Cardiovascular:      Rate and Rhythm: Normal rate and regular rhythm.      Pulses: Normal pulses.      Heart sounds: Normal heart sounds.   Pulmonary:      Effort: Pulmonary effort is normal.      Breath sounds: Normal breath sounds.   Abdominal:      General: Abdomen is flat. Bowel sounds are normal.      Palpations: Abdomen is soft.   Musculoskeletal:         General: Normal range of motion.      Cervical back: Normal range of motion and neck supple.   Skin:     General: Skin is warm and dry.   Neurological:      General: No focal deficit present.      Mental Status: She is alert and oriented to person, place, and time.   Psychiatric:         Mood and Affect: Mood normal.         Behavior: Behavior normal.           Overview/Hospital Course: stable    Significant Labs: All pertinent labs within the past 24 hours have been reviewed.  Lab Results   Component Value Date    WBC 4.84 11/14/2023    HGB 10.2 (L) 11/14/2023    HCT 32.2 (L) 11/14/2023    MCV 78.3 (L) 11/14/2023     11/14/2023         Recent Labs   Lab 11/14/23  0603      K 3.8   CO2 22   BUN 7.2   CREATININE 0.68   GLUCOSE 101*   CALCIUM 8.7         Significant Imaging: I have reviewed all pertinent imaging results/findings within the past 24 hours.    Assessment/Plan:      Active Diagnoses:    Diagnosis Date Noted POA    Stroke [I63.9] 11/11/2023 Yes      Problems Resolved During this Admission:     VTE Risk Mitigation (From admission, onward)           Ordered     IP VTE HIGH RISK PATIENT  Once         11/11/23 0813     Place sequential compression device  Until discontinued         11/11/23 0813                    Acute CVA status post TNK   Hypertensive emergency  Tobacco use  Normocytic anemia, stable      Plan:  Neuro reccs appreciate  Add norco for headache  Pt//ot  Cm for rehab  Add augmentin po    Jaylen Kaur,  MD  Department of Hospital Medicine   [facility]

## 2023-11-17 LAB
ANION GAP SERPL CALC-SCNC: 6 MEQ/L
BUN SERPL-MCNC: 8 MG/DL (ref 7–18.7)
CALCIUM SERPL-MCNC: 8.5 MG/DL (ref 8.4–10.2)
CHLORIDE SERPL-SCNC: 106 MMOL/L (ref 98–107)
CO2 SERPL-SCNC: 24 MMOL/L (ref 22–29)
CREAT SERPL-MCNC: 0.78 MG/DL (ref 0.55–1.02)
CREAT/UREA NIT SERPL: 10
GFR SERPLBLD CREATININE-BSD FMLA CKD-EPI: >60 MLS/MIN/1.73/M2
GLUCOSE SERPL-MCNC: 115 MG/DL (ref 74–100)
MAGNESIUM SERPL-MCNC: 2.4 MG/DL (ref 1.6–2.6)
POTASSIUM SERPL-SCNC: 4.2 MMOL/L (ref 3.5–5.1)
SODIUM SERPL-SCNC: 136 MMOL/L (ref 136–145)

## 2023-11-17 PROCEDURE — 25000003 PHARM REV CODE 250: Performed by: STUDENT IN AN ORGANIZED HEALTH CARE EDUCATION/TRAINING PROGRAM

## 2023-11-17 PROCEDURE — 21400001 HC TELEMETRY ROOM

## 2023-11-17 PROCEDURE — 25000003 PHARM REV CODE 250: Performed by: INTERNAL MEDICINE

## 2023-11-17 PROCEDURE — 63600175 PHARM REV CODE 636 W HCPCS: Performed by: NURSE PRACTITIONER

## 2023-11-17 PROCEDURE — 87040 BLOOD CULTURE FOR BACTERIA: CPT | Performed by: INTERNAL MEDICINE

## 2023-11-17 PROCEDURE — 80048 BASIC METABOLIC PNL TOTAL CA: CPT | Performed by: NURSE PRACTITIONER

## 2023-11-17 PROCEDURE — 25000003 PHARM REV CODE 250: Performed by: NURSE PRACTITIONER

## 2023-11-17 PROCEDURE — 83735 ASSAY OF MAGNESIUM: CPT | Performed by: NURSE PRACTITIONER

## 2023-11-17 RX ORDER — MAGNESIUM SULFATE HEPTAHYDRATE 40 MG/ML
4 INJECTION, SOLUTION INTRAVENOUS ONCE
Status: COMPLETED | OUTPATIENT
Start: 2023-11-17 | End: 2023-11-17

## 2023-11-17 RX ORDER — POTASSIUM CHLORIDE 14.9 MG/ML
40 INJECTION INTRAVENOUS ONCE
Status: COMPLETED | OUTPATIENT
Start: 2023-11-17 | End: 2023-11-17

## 2023-11-17 RX ADMIN — ACETAMINOPHEN 325MG 650 MG: 325 TABLET ORAL at 07:11

## 2023-11-17 RX ADMIN — ACETAMINOPHEN 325MG 650 MG: 325 TABLET ORAL at 01:11

## 2023-11-17 RX ADMIN — LABETALOL HYDROCHLORIDE 100 MG: 100 TABLET, FILM COATED ORAL at 10:11

## 2023-11-17 RX ADMIN — AMOXICILLIN AND CLAVULANATE POTASSIUM 1 TABLET: 875; 125 TABLET, FILM COATED ORAL at 08:11

## 2023-11-17 RX ADMIN — AMLODIPINE BESYLATE 10 MG: 5 TABLET ORAL at 10:11

## 2023-11-17 RX ADMIN — ASPIRIN 81 MG: 81 TABLET, COATED ORAL at 10:11

## 2023-11-17 RX ADMIN — CEFTRIAXONE SODIUM 2 G: 2 INJECTION, POWDER, FOR SOLUTION INTRAMUSCULAR; INTRAVENOUS at 02:11

## 2023-11-17 RX ADMIN — POTASSIUM CHLORIDE 40 MEQ: 14.9 INJECTION, SOLUTION INTRAVENOUS at 02:11

## 2023-11-17 RX ADMIN — MAGNESIUM SULFATE HEPTAHYDRATE 4 G: 40 INJECTION, SOLUTION INTRAVENOUS at 12:11

## 2023-11-17 RX ADMIN — AMOXICILLIN AND CLAVULANATE POTASSIUM 1 TABLET: 875; 125 TABLET, FILM COATED ORAL at 10:11

## 2023-11-17 RX ADMIN — LABETALOL HYDROCHLORIDE 100 MG: 100 TABLET, FILM COATED ORAL at 08:11

## 2023-11-17 RX ADMIN — ACETAMINOPHEN 325MG 650 MG: 325 TABLET ORAL at 06:11

## 2023-11-17 RX ADMIN — HYDROCODONE BITARTRATE AND ACETAMINOPHEN 1 TABLET: 5; 325 TABLET ORAL at 12:11

## 2023-11-17 NOTE — NURSING
"COLT BRANDON (on call for Dr. Dang) was paged and on phone; updated about pt's status: -130s; T99.7, "aching all over"; The pt's mother expressed concerns that there had been no labs ordered, and her daughter is worse than she was earlier; new orders noted.Pt's mother updated about the plan.     11/17/23 0005:   COLT BRANDON paged and on phone; updated re: lab results (K 3.1, Mg 1.0, WBC 12.7) Temp remains 99 range; pt had rested for a couple of hours but is now aching/cramping/sore; new orders noted for Rocephin IVPB Q24H, KCL 40 mEq IV, & Magnesium 3 gm IV; We will repeat labs later this morning  "

## 2023-11-17 NOTE — AI DETERIORATION ALERT
Artificial Intelligence Notification  [facility]  [address] [address]  Phone: [phone]    This documentation was triggered by an Artificial Intelligence Notification:    Admit Date: 11/10/2023   LOS: 6  Code Status: Full Code  : 1995  Age: 28 y.o.  Weight:   Wt Readings from Last 1 Encounters:   23 111.1 kg (244 lb 14.9 oz)        Sex: female  Bed: 797/[facility] A  MRN: [id number]  Attending Physician: Jaylen Kaur MD     Date of Alert: 2023  Time AI Alert Received:             Vitals:    23   BP: (!) 88/50   Pulse: (!) 117   Resp: 20   Temp: 99.7 °F (37.6 °C)     SpO2: 96 %      Artificial Intelligence alert discussed with Provider:     Name: JANNETTE Clark   Date/Time of Provider Notification:       Patient Condition: Pt admitted for CVA s/p TNK as well as hypertensive emergency.  Has since been downgraded since .  AI triggered s/t to low BP.  Spoke to Amber, RN - pt has been febrile, tachy, blood cx drawn today.  RN holding scheduled BP meds & will recheck vitals. RN notified to call back if needed.

## 2023-11-17 NOTE — PROGRESS NOTES
[facility] - 7th Floor [facility] Medicine  Progress Note    Patient Name: Trish Hale  MRN: [id number]  Patient Class: IP- Inpatient   Admission Date: 11/10/2023  Length of Stay: 7 days  Attending Physician: Jaylen Kaur MD  Primary Care Provider: Wendy Babin MD        Subjective:     Principal Problem:<principal problem not specified>    Interval History:   Today's info : seen and examined, no acute events overnight. Continues to improve   Bp improving  Headache better  Mastoid cyst on ct  Mri unremarkable  Tolerating therapy  Tmax of 102.6 - sepsis protocol initiated  Ua blood cx add iv abx and monitor    Review of Systems   Constitutional: Negative.    HENT: Negative.     Eyes: Negative.    Respiratory: Negative.     Cardiovascular: Negative.    Gastrointestinal: Negative.    Endocrine: Negative.    Genitourinary: Negative.    Musculoskeletal: Negative.    Skin: Negative.    Allergic/Immunologic: Negative.  Food allergies: paramjithena.   Neurological:  Positive for dizziness, weakness and headaches.   Hematological: Negative.    Psychiatric/Behavioral: Negative.       Objective:     Vital Signs (Most Recent):  Temp: (!) 102.6 °F (39.2 °C) (11/17/23 1336)  Pulse: (!) 114 (11/17/23 1056)  Resp: 18 (11/17/23 1056)  BP: 124/84 (11/17/23 1056)  SpO2: 100 % (11/17/23 1056) Vital Signs (24h Range):  Temp:  [98.3 °F (36.8 °C)-102.6 °F (39.2 °C)] 102.6 °F (39.2 °C)  Pulse:  [] 114  Resp:  [8-20] 18  SpO2:  [95 %-100 %] 100 %  BP: ()/(50-84) 124/84     Weight: 111.1 kg (244 lb 14.9 oz)  Body mass index is 38.35 kg/m².    Intake/Output Summary (Last 24 hours) at 11/17/2023 1504  Last data filed at 11/16/2023 2230  Gross per 24 hour   Intake 500 ml   Output 240 ml   Net 260 ml        Physical Exam  Vitals reviewed.   Constitutional:       Appearance: Normal appearance.   HENT:      Head: Normocephalic and atraumatic.      Right Ear: Tympanic membrane and external ear  normal.      Nose: Nose normal.      Mouth/Throat:      Mouth: Mucous membranes are moist.   Eyes:      Extraocular Movements: Extraocular movements intact.      Pupils: Pupils are equal, round, and reactive to light.   Cardiovascular:      Rate and Rhythm: Normal rate and regular rhythm.      Pulses: Normal pulses.      Heart sounds: Normal heart sounds.   Pulmonary:      Effort: Pulmonary effort is normal.      Breath sounds: Normal breath sounds.   Abdominal:      General: Abdomen is flat. Bowel sounds are normal.      Palpations: Abdomen is soft.   Musculoskeletal:         General: Normal range of motion.      Cervical back: Normal range of motion and neck supple.   Skin:     General: Skin is warm and dry.   Neurological:      General: No focal deficit present.      Mental Status: She is alert and oriented to person, place, and time.   Psychiatric:         Mood and Affect: Mood normal.         Behavior: Behavior normal.           Overview/Hospital Course: stable    Significant Labs: All pertinent labs within the past 24 hours have been reviewed.  Lab Results   Component Value Date    WBC 12.72 (H) 11/16/2023    HGB 9.2 (L) 11/16/2023    HCT 28.8 (L) 11/16/2023    MCV 78.0 (L) 11/16/2023     11/16/2023         Recent Labs   Lab 11/17/23  0632      K 4.2   CO2 24   BUN 8.0   CREATININE 0.78   GLUCOSE 115*   CALCIUM 8.5         Significant Imaging: I have reviewed all pertinent imaging results/findings within the past 24 hours.    Assessment/Plan:      Active Diagnoses:    Diagnosis Date Noted POA    Stroke [I63.9] 11/11/2023 Yes      Problems Resolved During this Admission:     VTE Risk Mitigation (From admission, onward)           Ordered     IP VTE HIGH RISK PATIENT  Once         11/11/23 0813     Place sequential compression device  Until discontinued         11/11/23 0813                    Acute CVA status post TNK   Hypertensive emergency  Tobacco use  Normocytic anemia, stable      Plan:  Neuro  reccs appreciate  Add norco for headache  Pt//ot  Cm for rehab  Add augmentin po    Jaylen Kaur MD  Department of Hospital Medicine   [facility] - 7th Floor ICU

## 2023-11-17 NOTE — PT/OT/SLP PROGRESS
Pt with 102 fever this afternoon and not feeling well enough for PT. Will follow up as appropriate.

## 2023-11-17 NOTE — PT/OT/SLP PROGRESS
Attempted OT treatment session, pt politely refusing stating she cannot participate in therapy today due to body aches, weakness, and pt reported fever. RN made aware.

## 2023-11-18 LAB
LEFT CCA DIST DIAS: 12 CM/S
LEFT CCA DIST SYS: 75 CM/S
LEFT CCA PROX DIAS: 17 CM/S
LEFT CCA PROX SYS: 89 CM/S
LEFT ECA SYS: 35 CM/S
LEFT ICA DIST DIAS: 44 CM/S
LEFT ICA DIST SYS: 72 CM/S
LEFT ICA MID DIAS: 32 CM/S
LEFT ICA MID SYS: 55 CM/S
LEFT ICA PROX DIAS: 22 CM/S
LEFT ICA PROX SYS: 48 CM/S
LEFT VERTEBRAL DIAS: 16 CM/S
LEFT VERTEBRAL SYS: 49 CM/S
OHS CV CAROTID RIGHT ICA EDV HIGHEST: 25
OHS CV CAROTID ULTRASOUND LEFT ICA/CCA RATIO: 0.96
OHS CV CAROTID ULTRASOUND RIGHT ICA/CCA RATIO: 0.79
OHS CV PV CAROTID LEFT HIGHEST CCA: 89
OHS CV PV CAROTID LEFT HIGHEST ICA: 72
OHS CV PV CAROTID RIGHT HIGHEST CCA: 84
OHS CV PV CAROTID RIGHT HIGHEST ICA: 66
OHS CV US CAROTID LEFT HIGHEST EDV: 44
RIGHT CCA DIST DIAS: 16 CM/S
RIGHT CCA DIST SYS: 84 CM/S
RIGHT CCA PROX DIAS: 13 CM/S
RIGHT CCA PROX SYS: 69 CM/S
RIGHT ECA DIAS: 13 CM/S
RIGHT ECA SYS: 66 CM/S
RIGHT ICA DIST DIAS: 25 CM/S
RIGHT ICA DIST SYS: 66 CM/S
RIGHT ICA MID DIAS: 17 CM/S
RIGHT ICA MID SYS: 46 CM/S
RIGHT ICA PROX DIAS: 16 CM/S
RIGHT ICA PROX SYS: 52 CM/S
RIGHT VERTEBRAL DIAS: 9 CM/S
RIGHT VERTEBRAL SYS: 31 CM/S

## 2023-11-18 PROCEDURE — 25000003 PHARM REV CODE 250: Performed by: INTERNAL MEDICINE

## 2023-11-18 PROCEDURE — 25000003 PHARM REV CODE 250: Performed by: NURSE PRACTITIONER

## 2023-11-18 PROCEDURE — 25000003 PHARM REV CODE 250: Performed by: STUDENT IN AN ORGANIZED HEALTH CARE EDUCATION/TRAINING PROGRAM

## 2023-11-18 PROCEDURE — 21400001 HC TELEMETRY ROOM

## 2023-11-18 PROCEDURE — 63600175 PHARM REV CODE 636 W HCPCS: Performed by: NURSE PRACTITIONER

## 2023-11-18 RX ADMIN — AMOXICILLIN AND CLAVULANATE POTASSIUM 1 TABLET: 875; 125 TABLET, FILM COATED ORAL at 09:11

## 2023-11-18 RX ADMIN — CEFTRIAXONE SODIUM 2 G: 2 INJECTION, POWDER, FOR SOLUTION INTRAMUSCULAR; INTRAVENOUS at 02:11

## 2023-11-18 RX ADMIN — SODIUM CHLORIDE: 9 INJECTION, SOLUTION INTRAVENOUS at 02:11

## 2023-11-18 RX ADMIN — AMOXICILLIN AND CLAVULANATE POTASSIUM 1 TABLET: 875; 125 TABLET, FILM COATED ORAL at 10:11

## 2023-11-18 RX ADMIN — LABETALOL HYDROCHLORIDE 100 MG: 100 TABLET, FILM COATED ORAL at 10:11

## 2023-11-18 RX ADMIN — ASPIRIN 81 MG: 81 TABLET, COATED ORAL at 10:11

## 2023-11-18 RX ADMIN — LABETALOL HYDROCHLORIDE 100 MG: 100 TABLET, FILM COATED ORAL at 09:11

## 2023-11-18 RX ADMIN — AMLODIPINE BESYLATE 10 MG: 5 TABLET ORAL at 10:11

## 2023-11-18 NOTE — PROGRESS NOTES
[facility] - 7th Floor [facility] Medicine  Progress Note    Patient Name: Trish Hale  MRN: [id number]  Patient Class: IP- Inpatient   Admission Date: 11/10/2023  Length of Stay: 8 days  Attending Physician: Jaylen Kaur MD  Primary Care Provider: Wendy Babin MD        Subjective:     Principal Problem:<principal problem not specified>    Interval History:   Today's info : seen and examined, no acute events overnight. Continues to improve   Bp improving  Headache better  Mastoid cyst on ct  Mri unremarkable  Tolerating therapy  Tmax of 102.6 - sepsis protocol initiated  Ua blood cx add iv abx and monitor    Fevers improved  Afebrile  Remains on olena bx    Review of Systems   Constitutional: Negative.    HENT: Negative.     Eyes: Negative.    Respiratory: Negative.     Cardiovascular: Negative.    Gastrointestinal: Negative.    Endocrine: Negative.    Genitourinary: Negative.    Musculoskeletal: Negative.    Skin: Negative.    Allergic/Immunologic: Negative.  Food allergies: manthena.   Neurological:  Positive for dizziness, weakness and headaches.   Hematological: Negative.    Psychiatric/Behavioral: Negative.       Objective:     Vital Signs (Most Recent):  Temp: 98.1 °F (36.7 °C) (11/18/23 1556)  Pulse: 90 (11/18/23 1556)  Resp: 18 (11/18/23 0731)  BP: 120/83 (11/18/23 1556)  SpO2: 100 % (11/18/23 1556) Vital Signs (24h Range):  Temp:  [97.6 °F (36.4 °C)-99.3 °F (37.4 °C)] 98.1 °F (36.7 °C)  Pulse:  [] 90  Resp:  [17-20] 18  SpO2:  [97 %-100 %] 100 %  BP: ()/(65-94) 120/83     Weight: 111.1 kg (244 lb 14.9 oz)  Body mass index is 38.35 kg/m².    Intake/Output Summary (Last 24 hours) at 11/18/2023 1602  Last data filed at 11/18/2023 1421  Gross per 24 hour   Intake 1938 ml   Output --   Net 1938 ml        Physical Exam  Vitals reviewed.   Constitutional:       Appearance: Normal appearance.   HENT:      Head: Normocephalic and atraumatic.      Right Ear:  Tympanic membrane and external ear normal.      Nose: Nose normal.      Mouth/Throat:      Mouth: Mucous membranes are moist.   Eyes:      Extraocular Movements: Extraocular movements intact.      Pupils: Pupils are equal, round, and reactive to light.   Cardiovascular:      Rate and Rhythm: Normal rate and regular rhythm.      Pulses: Normal pulses.      Heart sounds: Normal heart sounds.   Pulmonary:      Effort: Pulmonary effort is normal.      Breath sounds: Normal breath sounds.   Abdominal:      General: Abdomen is flat. Bowel sounds are normal.      Palpations: Abdomen is soft.   Musculoskeletal:         General: Normal range of motion.      Cervical back: Normal range of motion and neck supple.   Skin:     General: Skin is warm and dry.   Neurological:      General: No focal deficit present.      Mental Status: She is alert and oriented to person, place, and time.   Psychiatric:         Mood and Affect: Mood normal.         Behavior: Behavior normal.           Overview/Hospital Course: stable    Significant Labs: All pertinent labs within the past 24 hours have been reviewed.  Lab Results   Component Value Date    WBC 12.72 (H) 11/16/2023    HGB 9.2 (L) 11/16/2023    HCT 28.8 (L) 11/16/2023    MCV 78.0 (L) 11/16/2023     11/16/2023         Recent Labs   Lab 11/17/23  0632      K 4.2   CO2 24   BUN 8.0   CREATININE 0.78   GLUCOSE 115*   CALCIUM 8.5         Significant Imaging: I have reviewed all pertinent imaging results/findings within the past 24 hours.    Assessment/Plan:      Active Diagnoses:    Diagnosis Date Noted POA    Stroke [I63.9] 11/11/2023 Yes      Problems Resolved During this Admission:     VTE Risk Mitigation (From admission, onward)           Ordered     IP VTE HIGH RISK PATIENT  Once         11/11/23 0813     Place sequential compression device  Until discontinued         11/11/23 0813                    Acute CVA status post TNK   Hypertensive emergency  Tobacco  use  Normocytic anemia, stable      Plan:  Neuro reccs appreciate  Add norco for headache  Pt//ot  Cm for rehab  Add augmentin po    Jaylen Kaur MD  Department of Hospital Medicine   [facility] - 7th Floor ICU

## 2023-11-19 LAB
ALBUMIN SERPL-MCNC: 3 G/DL (ref 3.5–5)
ALBUMIN/GLOB SERPL: 0.9 RATIO (ref 1.1–2)
ALP SERPL-CCNC: 203 UNIT/L (ref 40–150)
ALT SERPL-CCNC: 212 UNIT/L (ref 0–55)
AST SERPL-CCNC: 238 UNIT/L (ref 5–34)
BASOPHILS # BLD AUTO: 0.01 X10(3)/MCL
BASOPHILS NFR BLD AUTO: 0.2 %
BILIRUB SERPL-MCNC: 0.5 MG/DL
BUN SERPL-MCNC: 10.1 MG/DL (ref 7–18.7)
CALCIUM SERPL-MCNC: 8.8 MG/DL (ref 8.4–10.2)
CHLORIDE SERPL-SCNC: 110 MMOL/L (ref 98–107)
CO2 SERPL-SCNC: 21 MMOL/L (ref 22–29)
CREAT SERPL-MCNC: 0.76 MG/DL (ref 0.55–1.02)
EOSINOPHIL # BLD AUTO: 0.14 X10(3)/MCL (ref 0–0.9)
EOSINOPHIL NFR BLD AUTO: 3.2 %
ERYTHROCYTE [DISTWIDTH] IN BLOOD BY AUTOMATED COUNT: 15.4 % (ref 11.5–17)
GFR SERPLBLD CREATININE-BSD FMLA CKD-EPI: >60 MLS/MIN/1.73/M2
GLOBULIN SER-MCNC: 3.4 GM/DL (ref 2.4–3.5)
GLUCOSE SERPL-MCNC: 93 MG/DL (ref 74–100)
HCT VFR BLD AUTO: 29.8 % (ref 37–47)
HGB BLD-MCNC: 9.4 G/DL (ref 12–16)
IMM GRANULOCYTES # BLD AUTO: 0.03 X10(3)/MCL (ref 0–0.04)
IMM GRANULOCYTES NFR BLD AUTO: 0.7 %
LYMPHOCYTES # BLD AUTO: 1.15 X10(3)/MCL (ref 0.6–4.6)
LYMPHOCYTES NFR BLD AUTO: 26.4 %
MCH RBC QN AUTO: 24.9 PG (ref 27–31)
MCHC RBC AUTO-ENTMCNC: 31.5 G/DL (ref 33–36)
MCV RBC AUTO: 78.8 FL (ref 80–94)
MONOCYTES # BLD AUTO: 0.33 X10(3)/MCL (ref 0.1–1.3)
MONOCYTES NFR BLD AUTO: 7.6 %
NEUTROPHILS # BLD AUTO: 2.7 X10(3)/MCL (ref 2.1–9.2)
NEUTROPHILS NFR BLD AUTO: 61.9 %
NRBC BLD AUTO-RTO: 0 %
PLATELET # BLD AUTO: 188 X10(3)/MCL (ref 130–400)
PMV BLD AUTO: 10.5 FL (ref 7.4–10.4)
POTASSIUM SERPL-SCNC: 4.2 MMOL/L (ref 3.5–5.1)
PROT SERPL-MCNC: 6.4 GM/DL (ref 6.4–8.3)
RBC # BLD AUTO: 3.78 X10(6)/MCL (ref 4.2–5.4)
SODIUM SERPL-SCNC: 139 MMOL/L (ref 136–145)
WBC # SPEC AUTO: 4.36 X10(3)/MCL (ref 4.5–11.5)

## 2023-11-19 PROCEDURE — 85025 COMPLETE CBC W/AUTO DIFF WBC: CPT | Performed by: INTERNAL MEDICINE

## 2023-11-19 PROCEDURE — 25000003 PHARM REV CODE 250: Performed by: NURSE PRACTITIONER

## 2023-11-19 PROCEDURE — 21400001 HC TELEMETRY ROOM

## 2023-11-19 PROCEDURE — 25000003 PHARM REV CODE 250: Performed by: INTERNAL MEDICINE

## 2023-11-19 PROCEDURE — 97116 GAIT TRAINING THERAPY: CPT | Mod: CQ

## 2023-11-19 PROCEDURE — 80053 COMPREHEN METABOLIC PANEL: CPT | Performed by: INTERNAL MEDICINE

## 2023-11-19 PROCEDURE — 63600175 PHARM REV CODE 636 W HCPCS: Performed by: NURSE PRACTITIONER

## 2023-11-19 PROCEDURE — 25000003 PHARM REV CODE 250: Performed by: STUDENT IN AN ORGANIZED HEALTH CARE EDUCATION/TRAINING PROGRAM

## 2023-11-19 RX ADMIN — ASPIRIN 81 MG: 81 TABLET, COATED ORAL at 10:11

## 2023-11-19 RX ADMIN — LABETALOL HYDROCHLORIDE 100 MG: 100 TABLET, FILM COATED ORAL at 08:11

## 2023-11-19 RX ADMIN — AMOXICILLIN AND CLAVULANATE POTASSIUM 1 TABLET: 875; 125 TABLET, FILM COATED ORAL at 08:11

## 2023-11-19 RX ADMIN — HYDROCODONE BITARTRATE AND ACETAMINOPHEN 1 TABLET: 5; 325 TABLET ORAL at 06:11

## 2023-11-19 RX ADMIN — AMLODIPINE BESYLATE 10 MG: 5 TABLET ORAL at 10:11

## 2023-11-19 RX ADMIN — CEFTRIAXONE SODIUM 2 G: 2 INJECTION, POWDER, FOR SOLUTION INTRAMUSCULAR; INTRAVENOUS at 03:11

## 2023-11-19 RX ADMIN — HYDROCODONE BITARTRATE AND ACETAMINOPHEN 1 TABLET: 5; 325 TABLET ORAL at 08:11

## 2023-11-19 RX ADMIN — LABETALOL HYDROCHLORIDE 100 MG: 100 TABLET, FILM COATED ORAL at 10:11

## 2023-11-19 RX ADMIN — AMOXICILLIN AND CLAVULANATE POTASSIUM 1 TABLET: 875; 125 TABLET, FILM COATED ORAL at 10:11

## 2023-11-19 NOTE — PT/OT/SLP PROGRESS
Physical Therapy Treatment    Patient Name:  Trish Hale   MRN:  [id number]    Recommendations:     Discharge therapy intensity: No Therapy Indicated   Discharge Equipment Recommendations: to be determined by next level of care  Barriers to discharge: None    Assessment:     Trish Hale is a 28 y.o. female admitted with a medical diagnosis of <principal problem not specified>.  She presents with the following impairments/functional limitations: weakness, decreased lower extremity function, gait instability, impaired balance, impaired self care skills, impaired functional mobility.    Rehab Prognosis: Good; patient would benefit from acute skilled PT services to address these deficits and reach maximum level of function.    Recent Surgery: * No surgery found *      Plan:     During this hospitalization, patient to be seen 6 x/week to address the identified rehab impairments via gait training, therapeutic activities and progress toward the following goals:    Plan of Care Expires:  12/10/23    Subjective     Chief Complaint: none    Objective:     Communicated with nurse prior to session.  Patient found supine with   upon PT entry to room.     General Precautions: Standard, fall  Orthopedic Precautions: N/A  Braces: N/A  Respiratory Status: Room air  Blood Pressure: 124/81 HR 89  Skin Integrity: Visible skin intact      Functional Mobility:  Bed mobility independent  Gait 200 ft without AD independently  High level dynamic balance activities independently    Recommend to d/c from PT services due to independence. PT will be notified.     Education Provided:  Role and goals of PT, transfer training, bed mobility, gait training, balance training, safety awareness, assistive device, strengthening exercises, and importance of participating in PT to return to PLOF.    GOALS:   Multidisciplinary Problems       Physical Therapy Goals          Problem: Physical Therapy    Goal Priority Disciplines Outcome Goal Variances  Interventions   Physical Therapy Goal     PT, PT/OT Ongoing, Progressing     Description: Goals to be met by: 12/10/23     Patient will increase functional independence with mobility by performin. Supine to sit with Stand-by Assistance- MET  2. Sit to supine with Stand-by Assistance- MET  3. Sit to stand transfer with Stand-by Assistance  4. Bed to chair transfer with Stand-by Assistance using Rolling Walker  5. Gait  x 150 feet with Stand-by Assistance using Rolling Walker.   6. Pt Ind with bed mobility                         Time Tracking:       Billable Minutes: Gait Training 15    Treatment Type: Treatment  PT/PTA: PTA     Number of PTA visits since last PT visit: 5     2023

## 2023-11-19 NOTE — PROGRESS NOTES
[facility] - 7th Floor [facility] Medicine  Progress Note    Patient Name: Trish Hale  MRN: [id number]  Patient Class: IP- Inpatient   Admission Date: 11/10/2023  Length of Stay: 9 days  Attending Physician: Jaylen Kaur MD  Primary Care Provider: Wendy Babin MD        Subjective:     Principal Problem:<principal problem not specified>    Interval History:   Today's info : seen and examined, no acute events overnight. Continues to improve   Bp improving  Headache better  Mastoid cyst on ct  Mri unremarkable  Tolerating therapy  Tmax of 102.6 - sepsis protocol initiated  Ua blood cx add iv abx and monitor    Fevers improved  Afebrile  Remains on olena bx    Review of Systems   Constitutional: Negative.    HENT: Negative.     Eyes: Negative.    Respiratory: Negative.     Cardiovascular: Negative.    Gastrointestinal: Negative.    Endocrine: Negative.    Genitourinary: Negative.    Musculoskeletal: Negative.    Skin: Negative.    Allergic/Immunologic: Negative.  Food allergies: manthena.   Neurological:  Positive for dizziness, weakness and headaches.   Hematological: Negative.    Psychiatric/Behavioral: Negative.       Objective:     Vital Signs (Most Recent):  Temp: 98 °F (36.7 °C) (11/19/23 1522)  Pulse: 85 (11/19/23 1522)  Resp: 18 (11/19/23 1522)  BP: (!) 144/89 (11/19/23 1522)  SpO2: 99 % (11/19/23 1522) Vital Signs (24h Range):  Temp:  [97.7 °F (36.5 °C)-98.2 °F (36.8 °C)] 98 °F (36.7 °C)  Pulse:  [77-90] 85  Resp:  [16-20] 18  SpO2:  [98 %-100 %] 99 %  BP: (120-148)/(78-98) 144/89     Weight: 111.1 kg (244 lb 14.9 oz)  Body mass index is 38.35 kg/m².    Intake/Output Summary (Last 24 hours) at 11/19/2023 1555  Last data filed at 11/18/2023 1745  Gross per 24 hour   Intake 240 ml   Output --   Net 240 ml        Physical Exam  Vitals reviewed.   Constitutional:       Appearance: Normal appearance.   HENT:      Head: Normocephalic and atraumatic.      Right Ear: Tympanic  membrane and external ear normal.      Nose: Nose normal.      Mouth/Throat:      Mouth: Mucous membranes are moist.   Eyes:      Extraocular Movements: Extraocular movements intact.      Pupils: Pupils are equal, round, and reactive to light.   Cardiovascular:      Rate and Rhythm: Normal rate and regular rhythm.      Pulses: Normal pulses.      Heart sounds: Normal heart sounds.   Pulmonary:      Effort: Pulmonary effort is normal.      Breath sounds: Normal breath sounds.   Abdominal:      General: Abdomen is flat. Bowel sounds are normal.      Palpations: Abdomen is soft.   Musculoskeletal:         General: Normal range of motion.      Cervical back: Normal range of motion and neck supple.   Skin:     General: Skin is warm and dry.   Neurological:      General: No focal deficit present.      Mental Status: She is alert and oriented to person, place, and time.   Psychiatric:         Mood and Affect: Mood normal.         Behavior: Behavior normal.           Overview/Hospital Course: stable    Significant Labs: All pertinent labs within the past 24 hours have been reviewed.  Lab Results   Component Value Date    WBC 4.36 (L) 11/19/2023    HGB 9.4 (L) 11/19/2023    HCT 29.8 (L) 11/19/2023    MCV 78.8 (L) 11/19/2023     11/19/2023         Recent Labs   Lab 11/19/23  0511      K 4.2   CO2 21*   BUN 10.1   CREATININE 0.76   GLUCOSE 93   CALCIUM 8.8         Significant Imaging: I have reviewed all pertinent imaging results/findings within the past 24 hours.    Assessment/Plan:      Active Diagnoses:    Diagnosis Date Noted POA    Stroke [I63.9] 11/11/2023 Yes      Problems Resolved During this Admission:     VTE Risk Mitigation (From admission, onward)           Ordered     IP VTE HIGH RISK PATIENT  Once         11/11/23 0813     Place sequential compression device  Until discontinued         11/11/23 0813                    Acute CVA status post TNK   Hypertensive emergency  Tobacco use  Normocytic anemia,  stable      Plan:  Neuro reccs appreciate  Add norco for headache  Pt//ot  Cm for rehab  Iv abx  Follow cx  Kristin home with hh in am    Jaylen Kaur MD  Department of Hospital Medicine   [facility] - 7th Floor ICU

## 2023-11-19 NOTE — PLAN OF CARE
Problem: Adult Inpatient Plan of Care  Goal: Plan of Care Review  Outcome: Ongoing, Progressing  Goal: Patient-Specific Goal (Individualized)  Outcome: Ongoing, Progressing  Goal: Absence of Hospital-Acquired Illness or Injury  Outcome: Ongoing, Progressing  Goal: Optimal Comfort and Wellbeing  Outcome: Ongoing, Progressing  Goal: Readiness for Transition of Care  Outcome: Ongoing, Progressing     Problem: Skin Injury Risk Increased  Goal: Skin Health and Integrity  Outcome: Ongoing, Progressing     Problem: Cognitive Impairment (Stroke, Ischemic/Transient Ischemic Attack)  Goal: Optimal Cognitive Function  Outcome: Met     Problem: Functional Ability Impaired (Stroke, Ischemic/Transient Ischemic Attack)  Goal: Optimal Functional Ability  Outcome: Ongoing, Progressing     Problem: Respiratory Compromise (Stroke, Ischemic/Transient Ischemic Attack)  Goal: Effective Oxygenation and Ventilation  Outcome: Met     Problem: Swallowing Impairment (Stroke, Ischemic/Transient Ischemic Attack)  Goal: Optimal Eating and Swallowing without Aspiration  Outcome: Met     Problem: Urinary Elimination Impaired (Stroke, Ischemic/Transient Ischemic Attack)  Goal: Effective Urinary Elimination  Outcome: Met

## 2023-11-20 VITALS
OXYGEN SATURATION: 100 % | HEART RATE: 99 BPM | WEIGHT: 244.94 LBS | RESPIRATION RATE: 18 BRPM | HEIGHT: 67 IN | DIASTOLIC BLOOD PRESSURE: 92 MMHG | BODY MASS INDEX: 38.44 KG/M2 | SYSTOLIC BLOOD PRESSURE: 131 MMHG | TEMPERATURE: 98 F

## 2023-11-20 PROCEDURE — 25000003 PHARM REV CODE 250: Performed by: NURSE PRACTITIONER

## 2023-11-20 PROCEDURE — 25000003 PHARM REV CODE 250: Performed by: INTERNAL MEDICINE

## 2023-11-20 PROCEDURE — 63600175 PHARM REV CODE 636 W HCPCS: Performed by: NURSE PRACTITIONER

## 2023-11-20 PROCEDURE — 25000003 PHARM REV CODE 250: Performed by: STUDENT IN AN ORGANIZED HEALTH CARE EDUCATION/TRAINING PROGRAM

## 2023-11-20 RX ADMIN — AMOXICILLIN AND CLAVULANATE POTASSIUM 1 TABLET: 875; 125 TABLET, FILM COATED ORAL at 08:11

## 2023-11-20 RX ADMIN — LABETALOL HYDROCHLORIDE 100 MG: 100 TABLET, FILM COATED ORAL at 08:11

## 2023-11-20 RX ADMIN — ASPIRIN 81 MG: 81 TABLET, COATED ORAL at 08:11

## 2023-11-20 RX ADMIN — CEFTRIAXONE SODIUM 2 G: 2 INJECTION, POWDER, FOR SOLUTION INTRAMUSCULAR; INTRAVENOUS at 01:11

## 2023-11-20 RX ADMIN — AMLODIPINE BESYLATE 10 MG: 5 TABLET ORAL at 08:11

## 2023-11-20 NOTE — NURSING
"Patient demanding to leave. Nurse informed patient that she did not have d/c orders at present. Patient stated "MA'AM I GOT TO GO, MY NERVES BAD, I DON'T WANT TO BE IN HERE ANYMORE!" Nurse attempted to explain risks of leaving AMA patient stated "I KNOW! 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 Charge nurse aware, removed patient IV and has notified physician. Patient walked off unit after IV removed .  "

## 2023-11-20 NOTE — PLAN OF CARE
Problem: Adult Inpatient Plan of Care  Goal: Plan of Care Review  Outcome: Ongoing, Progressing  Goal: Patient-Specific Goal (Individualized)  Outcome: Ongoing, Progressing  Goal: Absence of Hospital-Acquired Illness or Injury  Outcome: Ongoing, Progressing  Goal: Optimal Comfort and Wellbeing  Outcome: Ongoing, Progressing  Goal: Readiness for Transition of Care  Outcome: Ongoing, Progressing     Problem: Skin Injury Risk Increased  Goal: Skin Health and Integrity  Outcome: Ongoing, Progressing     Problem: Adjustment to Illness (Stroke, Ischemic/Transient Ischemic Attack)  Goal: Optimal Coping  Outcome: Ongoing, Progressing     Problem: Bowel Elimination Impaired (Stroke, Ischemic/Transient Ischemic Attack)  Goal: Effective Bowel Elimination  Outcome: Ongoing, Progressing     Problem: Cerebral Tissue Perfusion (Stroke, Ischemic/Transient Ischemic Attack)  Goal: Optimal Cerebral Tissue Perfusion  Outcome: Ongoing, Progressing     Problem: Communication Impairment (Stroke, Ischemic/Transient Ischemic Attack)  Goal: Improved Communication Skills  Outcome: Ongoing, Progressing     Problem: Functional Ability Impaired (Stroke, Ischemic/Transient Ischemic Attack)  Goal: Optimal Functional Ability  Outcome: Ongoing, Progressing     Problem: Sensorimotor Impairment (Stroke, Ischemic/Transient Ischemic Attack)  Goal: Improved Sensorimotor Function  Outcome: Ongoing, Progressing

## 2023-11-22 LAB
BACTERIA BLD CULT: NORMAL
BACTERIA BLD CULT: NORMAL

## 2023-12-13 ENCOUNTER — HOSPITAL ENCOUNTER (EMERGENCY)
Facility: HOSPITAL | Age: 28
Discharge: HOME | End: 2023-12-14
Attending: STUDENT IN AN ORGANIZED HEALTH CARE EDUCATION/TRAINING PROGRAM
Payer: MEDICAID

## 2023-12-13 DIAGNOSIS — R60.9 DEPENDENT EDEMA: Primary | ICD-10-CM

## 2023-12-13 DIAGNOSIS — R06.02 SHORTNESS OF BREATH: ICD-10-CM

## 2023-12-13 LAB
ALBUMIN SERPL-MCNC: 3.6 G/DL (ref 3.5–5)
ALBUMIN/GLOB SERPL: 0.9 RATIO (ref 1.1–2)
ALP SERPL-CCNC: 73 UNIT/L (ref 40–150)
ALT SERPL-CCNC: 19 UNIT/L (ref 0–55)
APPEARANCE UR: CLEAR
AST SERPL-CCNC: 21 UNIT/L (ref 5–34)
B-HCG SERPL QL: NEGATIVE
BACTERIA #/AREA URNS AUTO: ABNORMAL /HPF
BASOPHILS # BLD AUTO: 0.03 X10(3)/MCL
BASOPHILS NFR BLD AUTO: 0.4 %
BILIRUB SERPL-MCNC: 0.2 MG/DL
BILIRUB UR QL STRIP.AUTO: NEGATIVE
BNP BLD-MCNC: 27 PG/ML
BUN SERPL-MCNC: 18.3 MG/DL (ref 7–18.7)
CALCIUM SERPL-MCNC: 9.4 MG/DL (ref 8.4–10.2)
CHLORIDE SERPL-SCNC: 108 MMOL/L (ref 98–107)
CO2 SERPL-SCNC: 24 MMOL/L (ref 22–29)
COLOR UR AUTO: ABNORMAL
CREAT SERPL-MCNC: 1.02 MG/DL (ref 0.55–1.02)
EOSINOPHIL # BLD AUTO: 0.21 X10(3)/MCL (ref 0–0.9)
EOSINOPHIL NFR BLD AUTO: 2.8 %
ERYTHROCYTE [DISTWIDTH] IN BLOOD BY AUTOMATED COUNT: 15.9 % (ref 11.5–17)
FLUAV AG UPPER RESP QL IA.RAPID: NOT DETECTED
FLUBV AG UPPER RESP QL IA.RAPID: NOT DETECTED
GFR SERPLBLD CREATININE-BSD FMLA CKD-EPI: >60 MLS/MIN/1.73/M2
GLOBULIN SER-MCNC: 4.1 GM/DL (ref 2.4–3.5)
GLUCOSE SERPL-MCNC: 114 MG/DL (ref 74–100)
GLUCOSE UR QL STRIP.AUTO: NORMAL
HCT VFR BLD AUTO: 30.4 % (ref 37–47)
HGB BLD-MCNC: 9.5 G/DL (ref 12–16)
IMM GRANULOCYTES # BLD AUTO: 0.05 X10(3)/MCL (ref 0–0.04)
IMM GRANULOCYTES NFR BLD AUTO: 0.7 %
INR PPP: 1
KETONES UR QL STRIP.AUTO: ABNORMAL
LEUKOCYTE ESTERASE UR QL STRIP.AUTO: NEGATIVE
LYMPHOCYTES # BLD AUTO: 2.48 X10(3)/MCL (ref 0.6–4.6)
LYMPHOCYTES NFR BLD AUTO: 32.8 %
MCH RBC QN AUTO: 24.9 PG (ref 27–31)
MCHC RBC AUTO-ENTMCNC: 31.3 G/DL (ref 33–36)
MCV RBC AUTO: 79.8 FL (ref 80–94)
MONOCYTES # BLD AUTO: 0.42 X10(3)/MCL (ref 0.1–1.3)
MONOCYTES NFR BLD AUTO: 5.6 %
NEUTROPHILS # BLD AUTO: 4.37 X10(3)/MCL (ref 2.1–9.2)
NEUTROPHILS NFR BLD AUTO: 57.7 %
NITRITE UR QL STRIP.AUTO: NEGATIVE
NRBC BLD AUTO-RTO: 0 %
PH UR STRIP.AUTO: 6.5 [PH]
PLATELET # BLD AUTO: 251 X10(3)/MCL (ref 130–400)
PMV BLD AUTO: 9.7 FL (ref 7.4–10.4)
POTASSIUM SERPL-SCNC: 3.5 MMOL/L (ref 3.5–5.1)
PROT SERPL-MCNC: 7.7 GM/DL (ref 6.4–8.3)
PROT UR QL STRIP.AUTO: NEGATIVE
PROTHROMBIN TIME: 12.8 SECONDS (ref 12.5–14.5)
RBC # BLD AUTO: 3.81 X10(6)/MCL (ref 4.2–5.4)
RBC #/AREA URNS AUTO: ABNORMAL /HPF
RBC UR QL AUTO: ABNORMAL
SARS-COV-2 RNA RESP QL NAA+PROBE: NOT DETECTED
SODIUM SERPL-SCNC: 139 MMOL/L (ref 136–145)
SP GR UR STRIP.AUTO: 1.03 (ref 1–1.03)
SQUAMOUS #/AREA URNS LPF: ABNORMAL /HPF
TROPONIN I SERPL-MCNC: <0.01 NG/ML (ref 0–0.04)
UROBILINOGEN UR STRIP-ACNC: NORMAL
WBC # SPEC AUTO: 7.56 X10(3)/MCL (ref 4.5–11.5)
WBC #/AREA URNS AUTO: ABNORMAL /HPF

## 2023-12-13 PROCEDURE — 81025 URINE PREGNANCY TEST: CPT | Performed by: NURSE PRACTITIONER

## 2023-12-13 PROCEDURE — 0240U COVID/FLU A&B PCR: CPT | Performed by: PHYSICIAN ASSISTANT

## 2023-12-13 PROCEDURE — 99285 EMERGENCY DEPT VISIT HI MDM: CPT | Mod: 25

## 2023-12-13 PROCEDURE — 93005 ELECTROCARDIOGRAM TRACING: CPT

## 2023-12-13 PROCEDURE — 80053 COMPREHEN METABOLIC PANEL: CPT | Performed by: NURSE PRACTITIONER

## 2023-12-13 PROCEDURE — 85025 COMPLETE CBC W/AUTO DIFF WBC: CPT | Performed by: NURSE PRACTITIONER

## 2023-12-13 PROCEDURE — 81001 URINALYSIS AUTO W/SCOPE: CPT | Performed by: NURSE PRACTITIONER

## 2023-12-13 PROCEDURE — 83880 ASSAY OF NATRIURETIC PEPTIDE: CPT | Performed by: NURSE PRACTITIONER

## 2023-12-13 PROCEDURE — 84484 ASSAY OF TROPONIN QUANT: CPT | Performed by: NURSE PRACTITIONER

## 2023-12-13 PROCEDURE — 85610 PROTHROMBIN TIME: CPT | Performed by: NURSE PRACTITIONER

## 2023-12-13 RX ADMIN — IOPAMIDOL 100 ML: 755 INJECTION, SOLUTION INTRAVENOUS at 11:12

## 2023-12-14 VITALS
WEIGHT: 252 LBS | DIASTOLIC BLOOD PRESSURE: 73 MMHG | SYSTOLIC BLOOD PRESSURE: 123 MMHG | TEMPERATURE: 98 F | OXYGEN SATURATION: 100 % | BODY MASS INDEX: 39.46 KG/M2 | HEART RATE: 80 BPM | RESPIRATION RATE: 15 BRPM

## 2023-12-14 PROCEDURE — 25500020 PHARM REV CODE 255: Performed by: PHYSICIAN ASSISTANT

## 2023-12-14 NOTE — ED PROVIDER NOTES
Encounter Date: 12/13/2023       History     Chief Complaint   Patient presents with    Swelling     Patient reports swelling to legs/feet and hands last week with sob that started a couple days ago. Reports CP midsternal. No NVD. Hx of CVA, with some right sided deficits.      28 y.o. female with a history of hypertension and CVA x1 month ago with right-sided residual deficits (mild weakness) presents to the ED with swelling to bilateral lower extremities onset 1 week ago with associated chest pain and shortness breath.  Also notes some congestion and cough for the last several days.  Knows that the right lower leg seems to be a little bit more swollen than the left and notes more pain to that area.  States that she has been ambulating as much as she can.  Denies fever, chills, abdominal pain, nausea, vomiting, diarrhea.  Notes she does have some neuropathy in her lower extremities as well    The history is provided by the patient. No  was used.     Review of patient's allergies indicates:   Allergen Reactions    Sumatriptan Nausea And Vomiting     No past medical history on file.  No past surgical history on file.  No family history on file.     Review of Systems   Constitutional:  Negative for chills and fever.   HENT:  Positive for congestion.    Eyes:  Negative for visual disturbance.   Respiratory:  Positive for cough and shortness of breath.    Cardiovascular:  Positive for chest pain and leg swelling.   Gastrointestinal:  Negative for abdominal pain, nausea and vomiting.   Genitourinary:  Negative for dysuria.   Musculoskeletal:  Negative for arthralgias.   Skin:  Negative for color change and rash.   Neurological:  Negative for dizziness and headaches.   Psychiatric/Behavioral:  Negative for behavioral problems.    All other systems reviewed and are negative.      Physical Exam     Initial Vitals [12/13/23 1803]   BP Pulse Resp Temp SpO2   (!) 153/98 91 20 97.7 °F (36.5 °C) 98 %      MAP        --         Physical Exam    Nursing note and vitals reviewed.  Constitutional: She appears well-developed and well-nourished.   HENT:   Head: Normocephalic and atraumatic.   Eyes: EOM are normal. Pupils are equal, round, and reactive to light.   Neck: Neck supple.   Cardiovascular:  Normal rate, regular rhythm, normal heart sounds and intact distal pulses.           Pulmonary/Chest: Breath sounds normal. No respiratory distress. She has no wheezes. She has no rhonchi. She has no rales. She exhibits no tenderness.   Abdominal: Abdomen is soft. Bowel sounds are normal. She exhibits no distension. There is no abdominal tenderness. There is no rebound.   Musculoskeletal:         General: Normal range of motion.      Cervical back: Neck supple.      Right lower leg: Tenderness present. No swelling, deformity, lacerations or bony tenderness.      Left lower leg: No swelling, deformity, lacerations, tenderness or bony tenderness.     Neurological: She is alert and oriented to person, place, and time. She has normal strength. She displays a negative Romberg sign. Coordination and gait normal. GCS score is 15. GCS eye subscore is 4. GCS verbal subscore is 5. GCS motor subscore is 6.   Sensation intact to distal extremities, weakness noted to RLE which patient states is from previous CVA; no weakness in RUE   Skin: Skin is warm and dry. Capillary refill takes less than 2 seconds.   Psychiatric: She has a normal mood and affect.         ED Course   Procedures  Labs Reviewed   COMPREHENSIVE METABOLIC PANEL - Abnormal; Notable for the following components:       Result Value    Chloride 108 (*)     Glucose Level 114 (*)     Globulin 4.1 (*)     Albumin/Globulin Ratio 0.9 (*)     All other components within normal limits   URINALYSIS, REFLEX TO URINE CULTURE - Abnormal; Notable for the following components:    Specific Gravity, UA 1.032 (*)     Ketones, UA Trace (*)     Blood, UA Trace (*)     All other components within  normal limits   CBC WITH DIFFERENTIAL - Abnormal; Notable for the following components:    RBC 3.81 (*)     Hgb 9.5 (*)     Hct 30.4 (*)     MCV 79.8 (*)     MCH 24.9 (*)     MCHC 31.3 (*)     IG# 0.05 (*)     All other components within normal limits   B-TYPE NATRIURETIC PEPTIDE - Normal   HCG QUALITATIVE URINE - Normal   TROPONIN I - Normal   PROTIME-INR - Normal   COVID/FLU A&B PCR - Normal    Narrative:     The Xpert Xpress SARS-CoV-2/FLU/RSV plus is a rapid, multiplexed real-time PCR test intended for the simultaneous qualitative detection and differentiation of SARS-CoV-2, Influenza A, Influenza B, and respiratory syncytial virus (RSV) viral RNA in either nasopharyngeal swab or nasal swab specimens.         CBC W/ AUTO DIFFERENTIAL    Narrative:     The following orders were created for panel order CBC Auto Differential.  Procedure                               Abnormality         Status                     ---------                               -----------         ------                     CBC with Differential[[id number]]       Abnormal            Final result                 Please view results for these tests on the individual orders.     EKG Readings: (Independently Interpreted)   Initial Reading: No STEMI. Rhythm: Normal Sinus Rhythm. Heart Rate: 92. Ectopy: No Ectopy. Conduction: Normal. ST Segments: Normal ST Segments. T Waves: Normal. Axis: Normal.       Imaging Results              CTA Chest Non-Coronary (PE Studies) (Preliminary result)  Result time 12/14/23 00:09:57      Preliminary result by Roshan Donovan Jr., MD (12/14/23 00:09:57)                   Narrative:    START OF REPORT:  Technique: CT Scan of the chest was performed with intravenous contrast with direct axial images as well as sagittal and coronal reconstruction images pulmonary embolus protocol.    Dosage Information: Automated Exposure Control was utilized.    Comparison: None.    Clinical History: Sob, leg  swelling.    Findings:  Soft Tissues: Unremarkable.  Neck: The thyroid gland appear unremarkable.  Mediastinum: The mediastinal structures are within normal limits.  Heart: The heart size is within normal limits.  Aorta: No aortic dissection or aneurysm is seen.  Pulmonary Arteries: No filling defects are seen in the pulmonary arteries to suggest pulmonary embolus.  Lungs: The lungs are clear with no focal infiltrate or airspace disease.  Pleura: No effusions or pneumothorax are identified.  Bony Structures:  Spine: The visualized dorsal spine appears unremarkable.  Abdomen: The visualized upper abdominal organs appear unremarkable.      Impression:  1. No filling defects are seen in the pulmonary arteries to suggest pulmonary embolus.  2. No acute intrathoracic process identified. Details and other findings as discussed above.                                         X-Ray Chest PA And Lateral (Final result)  Result time 12/13/23 18:47:43      Final result by Roderick Denton MD (12/13/23 18:47:43)                   Impression:      No acute pulmonary process appreciated.      Electronically signed by: Roderick Denton  Date:    12/13/2023  Time:    18:47               Narrative:    EXAMINATION:  XR CHEST PA AND LATERAL    CLINICAL HISTORY:  Shortness of breath;    TECHNIQUE:  PA and lateral radiographs of the chest    COMPARISON:  Radiography 02/17/2018    FINDINGS:  Normal cardiac silhouette. The lungs are well-inflated. No consolidation identified. No pleural effusion or discernible pneumothorax.                                       Medications   iopamidoL (ISOVUE-370) injection 100 mL (100 mLs Intravenous Given 12/13/23 2278)     Medical Decision Making  Differential diagnosis:  COVID, flu, ACS, CHF, COPD, pulmonary embolism, DVT, pneumonia, bronchitis, viral syndrome    28 y.o. female with a history of hypertension and CVA x1 month ago with right-sided residual deficits (mild weakness) presents to the ED with  swelling to bilateral lower extremities onset 1 week ago with associated chest pain and shortness breath.  Also notes some congestion and cough for the last several days.  Knows that the right lower leg seems to be a little bit more swollen than the left and notes more pain to that area.  States that she has been ambulating as much as she can.  Denies fever, chills, abdominal pain, nausea, vomiting, diarrhea.  Notes she does have some neuropathy in her lower extremities as well      Amount and/or Complexity of Data Reviewed  Labs: ordered.  Radiology: ordered.  ECG/medicine tests: ordered.    Risk  Prescription drug management.               ED Course as of 12/14/23 0028   Wed Dec 13, 2023   2337 CV US of RLE with no evidence of deep or superficial thrombus.    [MA]   u Dec 14, 2023   0025 On re-evaluation, patient was resting comfortably.  States that she does not have any chest pain, shortness breath, or leg pain at this time.  States that her main concern was the swelling she had in her legs; notes she called her PCP earlier today to notify her and was instructed to the ER for evaluation.  Discussed all benign lab work and imaging with the patient along with negative swab and US. Will discharge patient home with instructions to f/u with PCP this week.  She was understanding and agreeable with this plan. [MA]      ED Course User Index  [MA] Eyad Eli, [place]          BP (!) 138/92 (BP Location: Right arm, Patient Position: Sitting)   Pulse 80   Temp 97.7 °F (36.5 °C) (Temporal)   Resp 15   Wt 114.3 kg (252 lb)   SpO2 100%   BMI 39.46 kg/m²                    Clinical Impression:  Final diagnoses:  [R06.02] Shortness of breath  [R60.9] Dependent edema (Primary)          ED Disposition Condition    Discharge Stable          ED Prescriptions    None       Follow-up Information       Follow up With Specialties Details Why Contact Info    Wendy Babin MD Internal Medicine   [address] [place] [address]  [address]  [facility] [address]  [phone]      [facility] - Emergency Dept Emergency Medicine In 1 week If symptoms worsen [address] [facility] [id number] [phone]             Eyad Eli, PANICANOR  12/14/23 0028

## 2023-12-14 NOTE — FIRST PROVIDER EVALUATION
Medical screening examination initiated.  I have conducted a focused provider triage encounter, findings are as follows:    Brief history of present illness:  Patient states right UE and LE swelling. States chest pain and SOB.     Vitals:    12/13/23 1803   BP: (!) 153/98   Pulse: 91   Resp: 20   Temp: 97.7 °F (36.5 °C)   TempSrc: Temporal   SpO2: 98%   Weight: 114.3 kg (252 lb)       Pertinent physical exam:  Awake, alert, ambulatory      Brief workup plan:  Labs, EKG, Labs    Preliminary workup initiated; this workup will be continued and followed by the physician or advanced practice provider that is assigned to the patient when roomed.

## 2024-02-09 ENCOUNTER — HOSPITAL ENCOUNTER (OUTPATIENT)
Facility: HOSPITAL | Age: 29
Discharge: HOME HEALTH | End: 2024-02-12
Attending: STUDENT IN AN ORGANIZED HEALTH CARE EDUCATION/TRAINING PROGRAM | Admitting: INTERNAL MEDICINE
Payer: MEDICAID

## 2024-02-09 DIAGNOSIS — R44.9 RIGHT-SIDED SENSORY DEFICIT PRESENT: ICD-10-CM

## 2024-02-09 DIAGNOSIS — R29.818 ACUTE FOCAL NEUROLOGICAL DEFICIT: Primary | ICD-10-CM

## 2024-02-09 DIAGNOSIS — R53.1 WEAKNESS: ICD-10-CM

## 2024-02-09 PROBLEM — R20.0 RIGHT SIDED NUMBNESS: Status: ACTIVE | Noted: 2024-02-09

## 2024-02-09 LAB
ALBUMIN SERPL-MCNC: 3.6 G/DL (ref 3.5–5)
ALBUMIN SERPL-MCNC: 3.8 G/DL (ref 3.5–5)
ALBUMIN/GLOB SERPL: 0.8 RATIO (ref 1.1–2)
ALBUMIN/GLOB SERPL: 1 RATIO (ref 1.1–2)
ALP SERPL-CCNC: 80 UNIT/L (ref 40–150)
ALP SERPL-CCNC: 80 UNIT/L (ref 40–150)
ALT SERPL-CCNC: 14 UNIT/L (ref 0–55)
ALT SERPL-CCNC: 16 UNIT/L (ref 0–55)
ANION GAP SERPL CALC-SCNC: 12 MMOL/L (ref 8–16)
AST SERPL-CCNC: 14 UNIT/L (ref 5–34)
AST SERPL-CCNC: 18 UNIT/L (ref 5–34)
BASOPHILS # BLD AUTO: 0.03 X10(3)/MCL
BASOPHILS # BLD AUTO: 0.03 X10(3)/MCL
BASOPHILS NFR BLD AUTO: 0.4 %
BASOPHILS NFR BLD AUTO: 0.4 %
BILIRUB SERPL-MCNC: 0.2 MG/DL
BILIRUB SERPL-MCNC: 0.4 MG/DL
BUN SERPL-MCNC: 13.2 MG/DL (ref 7–18.7)
BUN SERPL-MCNC: 16.6 MG/DL (ref 7–18.7)
BUN SERPL-MCNC: 17 MG/DL (ref 6–30)
CALCIUM SERPL-MCNC: 9 MG/DL (ref 8.4–10.2)
CALCIUM SERPL-MCNC: 9.5 MG/DL (ref 8.4–10.2)
CHLORIDE SERPL-SCNC: 100 MMOL/L (ref 95–110)
CHLORIDE SERPL-SCNC: 104 MMOL/L (ref 98–107)
CHLORIDE SERPL-SCNC: 106 MMOL/L (ref 98–107)
CO2 SERPL-SCNC: 22 MMOL/L (ref 22–29)
CO2 SERPL-SCNC: 25 MMOL/L (ref 22–29)
CREAT SERPL-MCNC: 0.76 MG/DL (ref 0.55–1.02)
CREAT SERPL-MCNC: 0.8 MG/DL (ref 0.5–1.4)
CREAT SERPL-MCNC: 0.87 MG/DL (ref 0.55–1.02)
EOSINOPHIL # BLD AUTO: 0.31 X10(3)/MCL (ref 0–0.9)
EOSINOPHIL # BLD AUTO: 0.33 X10(3)/MCL (ref 0–0.9)
EOSINOPHIL NFR BLD AUTO: 4.1 %
EOSINOPHIL NFR BLD AUTO: 4.4 %
ERYTHROCYTE [DISTWIDTH] IN BLOOD BY AUTOMATED COUNT: 14.4 % (ref 11.5–17)
ERYTHROCYTE [DISTWIDTH] IN BLOOD BY AUTOMATED COUNT: 14.4 % (ref 11.5–17)
GFR SERPLBLD CREATININE-BSD FMLA CKD-EPI: >60 MLS/MIN/1.73/M2
GFR SERPLBLD CREATININE-BSD FMLA CKD-EPI: >60 MLS/MIN/1.73/M2
GLOBULIN SER-MCNC: 3.7 GM/DL (ref 2.4–3.5)
GLOBULIN SER-MCNC: 4.5 GM/DL (ref 2.4–3.5)
GLUCOSE SERPL-MCNC: 84 MG/DL (ref 74–100)
GLUCOSE SERPL-MCNC: 87 MG/DL (ref 74–100)
GLUCOSE SERPL-MCNC: 93 MG/DL (ref 70–110)
HCT VFR BLD AUTO: 31.9 % (ref 37–47)
HCT VFR BLD AUTO: 32.1 % (ref 37–47)
HCT VFR BLD CALC: 35 %PCV (ref 36–54)
HGB BLD-MCNC: 12 G/DL
HGB BLD-MCNC: 9.7 G/DL (ref 12–16)
HGB BLD-MCNC: 9.9 G/DL (ref 12–16)
IMM GRANULOCYTES # BLD AUTO: 0.02 X10(3)/MCL (ref 0–0.04)
IMM GRANULOCYTES # BLD AUTO: 0.02 X10(3)/MCL (ref 0–0.04)
IMM GRANULOCYTES NFR BLD AUTO: 0.2 %
IMM GRANULOCYTES NFR BLD AUTO: 0.3 %
INR PPP: 1
LYMPHOCYTES # BLD AUTO: 2.1 X10(3)/MCL (ref 0.6–4.6)
LYMPHOCYTES # BLD AUTO: 2.57 X10(3)/MCL (ref 0.6–4.6)
LYMPHOCYTES NFR BLD AUTO: 30.1 %
LYMPHOCYTES NFR BLD AUTO: 31.6 %
MCH RBC QN AUTO: 24.5 PG (ref 27–31)
MCH RBC QN AUTO: 24.9 PG (ref 27–31)
MCHC RBC AUTO-ENTMCNC: 30.4 G/DL (ref 33–36)
MCHC RBC AUTO-ENTMCNC: 30.8 G/DL (ref 33–36)
MCV RBC AUTO: 80.6 FL (ref 80–94)
MCV RBC AUTO: 80.7 FL (ref 80–94)
MONOCYTES # BLD AUTO: 0.49 X10(3)/MCL (ref 0.1–1.3)
MONOCYTES # BLD AUTO: 0.54 X10(3)/MCL (ref 0.1–1.3)
MONOCYTES NFR BLD AUTO: 6.6 %
MONOCYTES NFR BLD AUTO: 7 %
NEUTROPHILS # BLD AUTO: 4.03 X10(3)/MCL (ref 2.1–9.2)
NEUTROPHILS # BLD AUTO: 4.64 X10(3)/MCL (ref 2.1–9.2)
NEUTROPHILS NFR BLD AUTO: 57.1 %
NEUTROPHILS NFR BLD AUTO: 57.8 %
NRBC BLD AUTO-RTO: 0 %
NRBC BLD AUTO-RTO: 0 %
OHS QRS DURATION: 88 MS
OHS QTC CALCULATION: 449 MS
PLATELET # BLD AUTO: 268 X10(3)/MCL (ref 130–400)
PLATELET # BLD AUTO: 279 X10(3)/MCL (ref 130–400)
PMV BLD AUTO: 10.2 FL (ref 7.4–10.4)
PMV BLD AUTO: 10.2 FL (ref 7.4–10.4)
POC IONIZED CALCIUM: 1.22 MMOL/L (ref 1.06–1.42)
POC PTINR: 1.3 (ref 0.9–1.2)
POC PTWBT: 15 SEC (ref 9.7–14.3)
POC TCO2 (MEASURED): 31 MMOL/L (ref 23–29)
POCT GLUCOSE: 88 MG/DL (ref 70–110)
POTASSIUM BLD-SCNC: 3.6 MMOL/L (ref 3.5–5.1)
POTASSIUM SERPL-SCNC: 3.6 MMOL/L (ref 3.5–5.1)
POTASSIUM SERPL-SCNC: 3.7 MMOL/L (ref 3.5–5.1)
PROT SERPL-MCNC: 7.3 GM/DL (ref 6.4–8.3)
PROT SERPL-MCNC: 8.3 GM/DL (ref 6.4–8.3)
PROTHROMBIN TIME: 13.1 SECONDS (ref 12.5–14.5)
RBC # BLD AUTO: 3.96 X10(6)/MCL (ref 4.2–5.4)
RBC # BLD AUTO: 3.98 X10(6)/MCL (ref 4.2–5.4)
SAMPLE: ABNORMAL
SAMPLE: ABNORMAL
SODIUM BLD-SCNC: 139 MMOL/L (ref 136–145)
SODIUM SERPL-SCNC: 137 MMOL/L (ref 136–145)
SODIUM SERPL-SCNC: 138 MMOL/L (ref 136–145)
TSH SERPL-ACNC: 1.17 UIU/ML (ref 0.35–4.94)
WBC # SPEC AUTO: 6.98 X10(3)/MCL (ref 4.5–11.5)
WBC # SPEC AUTO: 8.13 X10(3)/MCL (ref 4.5–11.5)

## 2024-02-09 PROCEDURE — 82962 GLUCOSE BLOOD TEST: CPT

## 2024-02-09 PROCEDURE — G0378 HOSPITAL OBSERVATION PER HR: HCPCS

## 2024-02-09 PROCEDURE — 93005 ELECTROCARDIOGRAM TRACING: CPT

## 2024-02-09 PROCEDURE — 99285 EMERGENCY DEPT VISIT HI MDM: CPT | Mod: 25

## 2024-02-09 PROCEDURE — 21400001 HC TELEMETRY ROOM

## 2024-02-09 PROCEDURE — 25000003 PHARM REV CODE 250: Performed by: INTERNAL MEDICINE

## 2024-02-09 PROCEDURE — 80048 BASIC METABOLIC PNL TOTAL CA: CPT | Mod: XB

## 2024-02-09 PROCEDURE — 84443 ASSAY THYROID STIM HORMONE: CPT | Performed by: STUDENT IN AN ORGANIZED HEALTH CARE EDUCATION/TRAINING PROGRAM

## 2024-02-09 PROCEDURE — 80053 COMPREHEN METABOLIC PANEL: CPT | Performed by: STUDENT IN AN ORGANIZED HEALTH CARE EDUCATION/TRAINING PROGRAM

## 2024-02-09 PROCEDURE — 85025 COMPLETE CBC W/AUTO DIFF WBC: CPT | Performed by: STUDENT IN AN ORGANIZED HEALTH CARE EDUCATION/TRAINING PROGRAM

## 2024-02-09 PROCEDURE — 93010 ELECTROCARDIOGRAM REPORT: CPT | Mod: ,,, | Performed by: INTERNAL MEDICINE

## 2024-02-09 PROCEDURE — 80299 QUANTITATIVE ASSAY DRUG: CPT | Performed by: NURSE PRACTITIONER

## 2024-02-09 PROCEDURE — 85610 PROTHROMBIN TIME: CPT | Performed by: STUDENT IN AN ORGANIZED HEALTH CARE EDUCATION/TRAINING PROGRAM

## 2024-02-09 RX ORDER — ATORVASTATIN CALCIUM 40 MG/1
40 TABLET, FILM COATED ORAL NIGHTLY
Status: DISCONTINUED | OUTPATIENT
Start: 2024-02-09 | End: 2024-02-12 | Stop reason: HOSPADM

## 2024-02-09 RX ORDER — ACETAMINOPHEN 325 MG/1
650 TABLET ORAL EVERY 8 HOURS PRN
Status: DISCONTINUED | OUTPATIENT
Start: 2024-02-09 | End: 2024-02-09

## 2024-02-09 RX ORDER — ASPIRIN 81 MG/1
81 TABLET ORAL DAILY
COMMUNITY

## 2024-02-09 RX ORDER — GABAPENTIN 300 MG/1
300 CAPSULE ORAL 2 TIMES DAILY
COMMUNITY
Start: 2024-02-02

## 2024-02-09 RX ORDER — AMLODIPINE BESYLATE 5 MG/1
10 TABLET ORAL DAILY
Status: DISCONTINUED | OUTPATIENT
Start: 2024-02-09 | End: 2024-02-12 | Stop reason: HOSPADM

## 2024-02-09 RX ORDER — SODIUM CHLORIDE 0.9 % (FLUSH) 0.9 %
10 SYRINGE (ML) INJECTION
Status: DISCONTINUED | OUTPATIENT
Start: 2024-02-09 | End: 2024-02-12 | Stop reason: HOSPADM

## 2024-02-09 RX ORDER — ERGOCALCIFEROL 1.25 MG/1
50000 CAPSULE ORAL
COMMUNITY
Start: 2023-12-20

## 2024-02-09 RX ORDER — VALACYCLOVIR HYDROCHLORIDE 1 G/1
1000 TABLET, FILM COATED ORAL 2 TIMES DAILY
COMMUNITY

## 2024-02-09 RX ORDER — ACETAMINOPHEN 325 MG/1
650 TABLET ORAL EVERY 6 HOURS PRN
Status: DISCONTINUED | OUTPATIENT
Start: 2024-02-09 | End: 2024-02-12 | Stop reason: HOSPADM

## 2024-02-09 RX ORDER — ATORVASTATIN CALCIUM 40 MG/1
40 TABLET, FILM COATED ORAL NIGHTLY
COMMUNITY
Start: 2023-12-20

## 2024-02-09 RX ORDER — AMLODIPINE BESYLATE 10 MG/1
10 TABLET ORAL DAILY
COMMUNITY

## 2024-02-09 RX ORDER — GABAPENTIN 300 MG/1
300 CAPSULE ORAL 2 TIMES DAILY
Status: DISCONTINUED | OUTPATIENT
Start: 2024-02-09 | End: 2024-02-12 | Stop reason: HOSPADM

## 2024-02-09 RX ORDER — LABETALOL 200 MG/1
200 TABLET, FILM COATED ORAL 2 TIMES DAILY
COMMUNITY

## 2024-02-09 RX ORDER — TALC
6 POWDER (GRAM) TOPICAL NIGHTLY PRN
Status: DISCONTINUED | OUTPATIENT
Start: 2024-02-09 | End: 2024-02-12 | Stop reason: HOSPADM

## 2024-02-09 RX ORDER — LABETALOL 200 MG/1
200 TABLET, FILM COATED ORAL 2 TIMES DAILY
Status: DISCONTINUED | OUTPATIENT
Start: 2024-02-09 | End: 2024-02-12 | Stop reason: HOSPADM

## 2024-02-09 RX ORDER — ASPIRIN 81 MG/1
81 TABLET ORAL DAILY
Status: DISCONTINUED | OUTPATIENT
Start: 2024-02-09 | End: 2024-02-12 | Stop reason: HOSPADM

## 2024-02-09 RX ORDER — LOSARTAN POTASSIUM AND HYDROCHLOROTHIAZIDE 12.5; 1 MG/1; MG/1
1 TABLET ORAL DAILY
COMMUNITY
Start: 2023-12-20

## 2024-02-09 RX ORDER — PAROXETINE 30 MG/1
30 TABLET, FILM COATED ORAL NIGHTLY
COMMUNITY

## 2024-02-09 RX ADMIN — AMLODIPINE BESYLATE 10 MG: 5 TABLET ORAL at 05:02

## 2024-02-09 RX ADMIN — ATORVASTATIN CALCIUM 40 MG: 40 TABLET, FILM COATED ORAL at 08:02

## 2024-02-09 RX ADMIN — LABETALOL HYDROCHLORIDE 200 MG: 200 TABLET, FILM COATED ORAL at 08:02

## 2024-02-09 RX ADMIN — GABAPENTIN 300 MG: 300 CAPSULE ORAL at 08:02

## 2024-02-09 RX ADMIN — ASPIRIN 81 MG: 81 TABLET, COATED ORAL at 05:02

## 2024-02-09 NOTE — ED PROVIDER NOTES
Encounter Date: 2/9/2024    SCRIBE #1 NOTE: I, Kiana Travis, am scribing for, and in the presence of,  Krishan Dover MD. I have scribed the following portions of the note - Other sections scribed: HPI, ROS, PE.       History     Chief Complaint   Patient presents with    Cerebrovascular Accident     RIGHT SIDED WEAKNESS/NUMBNESS/DEC SENSATION/SLURRED SPEECH ONSET APPROX 2230 TONIGHT. HX OF CVA IN NOVEMBER 2023 WITH RESIDUAL RIGHT SIDED DEFICITS. GCS15     28 year old female with history of a CVA presents to the ED via EMS for neurological problems. Per EMS, pt began having a right sided headache and RUE pain at 15:30 yesterday (2/8). Pt is now having globally diminished sensation on her right side. EMS states that pt had a CVA in November and has had right sided deficit since. Pt had CBG of 100 en route. Pt has no other complaints.     The history is provided by the patient and the EMS personnel. No  was used.     Review of patient's allergies indicates:   Allergen Reactions    Sumatriptan Nausea And Vomiting     No past medical history on file.  No past surgical history on file.  No family history on file.     Review of Systems   Musculoskeletal:         RUE pain   Neurological:  Positive for numbness and headaches.   All other systems reviewed and are negative.      Physical Exam     Initial Vitals [02/09/24 0015]   BP Pulse Resp Temp SpO2   (!) 140/85 80 18 97.6 °F (36.4 °C) 100 %      MAP       --         Physical Exam    Nursing note and vitals reviewed.  Constitutional: She appears well-developed and well-nourished. She is not diaphoretic. No distress.   HENT:   Head: Normocephalic and atraumatic.   Right Ear: External ear normal.   Left Ear: External ear normal.   Nose: Nose normal.   Eyes: Conjunctivae and EOM are normal. Pupils are equal, round, and reactive to light. Right eye exhibits no discharge. Left eye exhibits no discharge.   Cardiovascular:  Normal rate, regular rhythm,  normal heart sounds and intact distal pulses.     Exam reveals no gallop and no friction rub.       No murmur heard.  Pulmonary/Chest: Breath sounds normal. No respiratory distress. She has no wheezes. She has no rhonchi. She has no rales. She exhibits no tenderness.   Abdominal: Abdomen is soft. Bowel sounds are normal. She exhibits no distension and no mass. There is no abdominal tenderness. There is no rebound and no guarding.   Musculoskeletal:         General: No edema. Normal range of motion.     Neurological: She is alert and oriented to person, place, and time. No cranial nerve deficit or sensory deficit.   Delayed speech; no slurred speech. 4/5 strength to RUE, 3/5 to RLE. No facial droop.    Skin: Skin is warm and dry. Capillary refill takes less than 2 seconds. No erythema. No pallor.         ED Course   Procedures  Labs Reviewed   COMPREHENSIVE METABOLIC PANEL - Abnormal; Notable for the following components:       Result Value    Globulin 4.5 (*)     Albumin/Globulin Ratio 0.8 (*)     All other components within normal limits   CBC WITH DIFFERENTIAL - Abnormal; Notable for the following components:    RBC 3.96 (*)     Hgb 9.7 (*)     Hct 31.9 (*)     MCH 24.5 (*)     MCHC 30.4 (*)     All other components within normal limits   COMPREHENSIVE METABOLIC PANEL - Abnormal; Notable for the following components:    Globulin 3.7 (*)     Albumin/Globulin Ratio 1.0 (*)     All other components within normal limits   CBC WITH DIFFERENTIAL - Abnormal; Notable for the following components:    RBC 3.98 (*)     Hgb 9.9 (*)     Hct 32.1 (*)     MCH 24.9 (*)     MCHC 30.8 (*)     All other components within normal limits   ISTAT CHEM8 - Abnormal; Notable for the following components:    POC TCO2 (MEASURED) 31 (*)     POC Hematocrit 35 (*)     All other components within normal limits   ISTAT PROCEDURE - Abnormal; Notable for the following components:    POC PTWBT 15.0 (*)     POC PTINR 1.3 (*)     All other components  within normal limits   PROTIME-INR - Normal   TSH - Normal   CBC W/ AUTO DIFFERENTIAL    Narrative:     The following orders were created for panel order CBC W/ AUTO DIFFERENTIAL.  Procedure                               Abnormality         Status                     ---------                               -----------         ------                     CBC with Differential[[id number]]       Abnormal            Final result                 Please view results for these tests on the individual orders.   CBC W/ AUTO DIFFERENTIAL    Narrative:     The following orders were created for panel order CBC auto differential.  Procedure                               Abnormality         Status                     ---------                               -----------         ------                     CBC with Differential[[id number]]       Abnormal            Final result                 Please view results for these tests on the individual orders.   POCT GLUCOSE, HAND-HELD DEVICE   POCT GLUCOSE   POCT PROTIME-INR   ISTAT CHEM8          Imaging Results              CTA STROKE MULTI-PHASE (Preliminary result)  Result time 02/09/24 01:36:07      Preliminary result by Roshan Donovan Jr., MD (02/09/24 01:36:07)                   Narrative:    START OF REPORT:  Technique: CT angiogram of the intracranial vessels was performed with intravenous contrast with direct axial as well as sagittal and coronal reformations. CT angiogram of the neck vessels was performed with intravenous contrast with direct axial as well as sagittal and coronal reformations.    Comparison: Comparison is with CTA head and neck study dated 2023-11-10 21:41:57 and noncontrast CT head study [id number] 00:27:20.    Clinical history: Code stroke, right side deficits, slow speech, hx of cva in november.    Findings:  Intracranial Vascular structures:  Internal carotid arteries: Unremarkable.  Middle cerebral arteries: Unremarkable.  Anterior cerebral  arteries: Unremarkable.  Vertebral arteries: Unremarkable.  Basilar artery: Unremarkable.  Posterior cerebral arteries: Unremarkable.  Neck Vascular structures: The visualized aorta and origin of the great vessels of the neck appear unremarkable.  Carotids:  Common carotid arteries: The right and the left common carotid arteries appear unremarkable.  Internal carotid artery: The right and the left internal carotid arteries appear unremarkable.  Vertebral arteries: The origins of both vertebral arteries are unremarkable. Both vertebral arteries are patent and normal in caliber.      Impression:  1. Unremarkable CT angiogram of the head. Unremarkable CT angiogram of the neck. Details and findings as noted above.                                         CT HEAD FOR STROKE (Preliminary result)  Result time 02/09/24 06:54:57      Preliminary result by Marialuisa Pablo MD (02/09/24 06:54:57)                   Narrative:    START OF REPORT:  Technique: CT of the head was performed without intravenous contrast with axial as well as coronal and sagittal images.    Comparison: Comparison is with study dated 2023-11-11 22:13:33.    Dosage Information: Automated exposure control was utilized.    Clinical history: Code stroke, right side deficits, slow speech, hx of cva in november.    Findings:  Hemorrhage: No acute intracranial hemorrhage is seen.  CSF spaces: The ventricles sulci and basal cisterns are within normal limits.  Brain parenchyma: Unremarkable with preservation of the grey white junction throughout. No acute infarct is identified.  Cerebellum: Unremarkable.  Sella and skull base: The sella appears to be within normal limits for age.  Herniation: None.  Intracranial calcifications: Incidental note is made of bilateral choroid plexus calcification. Incidental note is made of some pineal region calcification.  Calvarium: No acute linear or depressed skull fracture is seen.    Maxillofacial Structures: Again noted  is a 1.4 cm periapical cystic lesion in the left upper 2nd molar tooth, projecting into the left maxillary sinus cavity (series 3, image 6) and may reflect a dentigerous cyst.  Paranasal sinuses: The visualized paranasal sinuses appear clear with no mucoperiosteal thickening or air fluid levels identified.  Orbits: The orbits appear unremarkable.  Zygomatic arches: The zygomatic arches are intact and unremarkable.  Temporal bones and mastoids: The temporal bones and mastoids appear unremarkable.  TMJ: The mandibular condyles appear normally placed with respect to the mandibular fossa.    Notifications: This is a stroke protocol report and the results were discussed with the emergency room physician Dr. Rich prior to dictation at 2024-02-09 00:37:50 CST.      Impression:  1. No acute infarct is identified.  2. No acute intracranial process identified. Details and other findings as noted above.                          Preliminary result by Interface, Rad Results In (02/09/24 00:45:26)                   Narrative:    START OF REPORT:  Technique: CT of the head was performed without intravenous contrast with axial as well as coronal and sagittal images.    Comparison: Comparison is with study dated 2023-11-11 22:13:33.    Dosage Information: Automated exposure control was utilized.    Clinical history: Code stroke, right side deficits, slow speech, hx of cva in november.    Findings:  Hemorrhage: No acute intracranial hemorrhage is seen.  CSF spaces: The ventricles sulci and basal cisterns are within normal limits.  Brain parenchyma: Unremarkable with preservation of the grey white junction throughout. No acute infarct is identified.  Cerebellum: Unremarkable.  Sella and skull base: The sella appears to be within normal limits for age.  Herniation: None.  Intracranial calcifications: Incidental note is made of bilateral choroid plexus calcification. Incidental note is made of some pineal region  calcification.  Calvarium: No acute linear or depressed skull fracture is seen.    Maxillofacial Structures: Again noted is a 1.4 cm periapical cystic lesion in the left upper 2nd molar tooth, projecting into the left maxillary sinus cavity (series 3, image 6) and may reflect a dentigerous cyst.  Paranasal sinuses: The visualized paranasal sinuses appear clear with no mucoperiosteal thickening or air fluid levels identified.  Orbits: The orbits appear unremarkable.  Zygomatic arches: The zygomatic arches are intact and unremarkable.  Temporal bones and mastoids: The temporal bones and mastoids appear unremarkable.  TMJ: The mandibular condyles appear normally placed with respect to the mandibular fossa.    Notifications: This is a stroke protocol report and the results were discussed with the emergency room physician Dr. Rich prior to dictation at 2024-02-09 00:37:50 CST.      Impression:  1. No acute infarct is identified.  2. No acute intracranial process identified. Details and other findings as noted above.                                         Medications   sodium chloride 0.9% flush 10 mL (has no administration in time range)   melatonin tablet 6 mg (has no administration in time range)   acetaminophen tablet 650 mg (has no administration in time range)     Medical Decision Making  Differential diagnosis to include but not limited to CVA, TIA, hyperglycemia, electrolyte abnormality, head bleed, conversion disorder,    Alert well-appearing.  Resting comfortably.  NAD.  Continues to endorse some weakness, some decreased sensation to the right side.  History of similar in the past.  At that time was diagnosed with hypertensive encephalopathy.  No evidence of any significant hypertension at this time.  Will admit for further evaluation.  Further CVA workup.  Outside the window for TNK none given.  CTA head and neck unrevealing for any acute process.    Amount and/or Complexity of Data Reviewed  Independent  Historian: EMS     Details: Per EMS, pt began having a right sided headache and RUE pain at 15:30 yesterday (2/8). Pt is now having globally diminished sensation on her right side. EMS states that pt had a CVA in November and has had right sided deficit since. Pt had CBG of 100 en route.   External Data Reviewed: notes.     Details: Chart review reveals patient was seen 11/10/2023 similar complaints, slurred speech, right-sided weakness, right-sided numbness. He was given thrombolytics after discussion with stroke Neurology and admitted. MRI was negative. Ultimately diagnosed with hypertensive encephalopathy stroke team signed off.   Labs: ordered. Decision-making details documented in ED Course.  Radiology: ordered and independent interpretation performed. Decision-making details documented in ED Course.     Details: CT head unrevealing    Risk  OTC drugs.  Prescription drug management.  Decision regarding hospitalization.              Attending Attestation:           Physician Attestation for Scribe:  Physician Attestation Statement for Scribe #1: I, Krishan Dover MD, reviewed documentation, as scribed by Kiana Travis in my presence, and it is both accurate and complete.             ED Course as of 02/09/24 0655   Fri Feb 09, 2024   0029 Chart review reveals patient was seen 11/10/2023 similar complaints, slurred speech, right-sided weakness, right-sided numbness.  He was given thrombolytics after discussion with stroke Neurology and admitted.  MRI was negative.  Ultimately diagnosed with hypertensive encephalopathy stroke team signed off.  Patient ultimately left the hospital AMA. [MM]   0036 Spoke with radiologist, CT head non-con negative. [MM]   0038 Unfortunately patient's symptoms began approximately 8 hours ago ineligible for TNK. [MM]   0058 CBC W/ AUTO DIFFERENTIAL(!)  Stable anemia.  No leukocytosis. [MM]   0152 POC PTINR(!): 1.3 [MM]   0152 Comprehensive metabolic panel(!)  No significant  electrolyte abnormality or renal dysfunction. [MM]   0256 On re-evaluation patient continues to have some mild right-sided weakness, continues to endorse some numbness.  Discussed findings with the patient.  Negative CTs.  Agreeable with admission for further evaluation management. [MM]      ED Course User Index  [MM] Krishan Dover MD                             Clinical Impression:  Final diagnoses:  [R29.818] Acute focal neurological deficit  [R44.9] Right-sided sensory deficit present  [R53.1] Weakness          ED Disposition Condition    Admit                 Krishan Dover MD  02/09/24 0655

## 2024-02-09 NOTE — HPI
28 year old female with a past medical history of HTN, neuropathy, and anxiety presented to ED on 2/9 for right sided numbness. She reported HA and RUE pain on 2/8 at 15:30, last known normal immediately prior to HA. This happened immediately after she as fussing at her children. She reports last night around 10 pm she had a severe HA with posterior neck pain and nausea, she was concerned and came to ED for further evaluation. Upon arrival to ED, /85. CT head was negative. CTA head and neck was negative for LVO. Neurology was consulted for stroke workup.     Of note, she had a stroke workup in November 2023, presented with right sided weakness and HA received TNK, MRI brain was negative at that time. Symptoms thought to be 2/2 hypertensive encephalopathy. She reports previous history of migraines. She reports since November she has been out of work, she was the  at [employer], she tried to go back to work but felt too stressed out so she quit. She was going to go back to work today, but is here now. 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 Home medications were aspirin, labetalol, losartan, gabapentin and prozac.

## 2024-02-09 NOTE — UM SECONDARY REVIEW
28-year-old female admitted with right-sided weakness, paresthesia.  History of hypertension, previous CVA.  On admission, hypertensive in the 140s.  Admitted for acute CVA rule out.  Initial CT angiogram of the head showed no LV 0.  Initial CT scan of the head unremarkable.  Pending MRI.  On stroke protocol.  Given the young age, previous CVA history at which point she had received thrombolytics, however, with a negative MRI, would favor IP LOC.  Would allow the disease process to progress prior to bed augmentation.  Will pursue peer to peer         Leyda Johnston MD  Utilization Management  Physician Advisor  [facility]  02/09/2024

## 2024-02-09 NOTE — ASSESSMENT & PLAN NOTE
-presented with right sided numbness and HA  -Stroke RF: HTN      Stroke workup  -CT head: negative  -CTA head and neck: no Lvo or flow limiting stenosis  -TSH: 1.172      Plan  -continue stroke workup including MRI brain  -permissive HTN for now  -was on aspirin 81 mg at home  -continue aspirin 81 mg and atorvastatin 80 mg   -bedrest, symptom onset  1530 on 2/8

## 2024-02-09 NOTE — CONSULTS
[facility] - Emergency Dept  Neurology  Consult Note    Patient Name: Trish Hale  MRN: [id number]  Admission Date: 2/9/2024  Hospital Length of Stay: 0 days  Code Status: Full Code   Attending Provider: Jaylen Kaur MD   Consulting Provider: Lulu Borrego NP  Primary Care Physician: Wendy Babin MD  Principal Problem:<principal problem not specified>    Inpatient consult to Neurology  Consult performed by: Lulu Borrego NP  Consult ordered by: Krishan Dover MD         Subjective:     Chief Complaint:  HA and RUE pain      HPI:   28 year old female with a past medical history of HTN, neuropathy, and anxiety presented to ED on 2/9 for right sided numbness. She reported HA and RUE pain on 2/8 at 15:30, last known normal immediately prior to HA. This happened immediately after she as fussing at her children. She reports last night around 10 pm she had a severe HA with posterior neck pain and nausea, she was concerned and came to ED for further evaluation. Upon arrival to ED, /85. CT head was negative. CTA head and neck was negative for LVO. Neurology was consulted for stroke workup.     Of note, she had a stroke workup in November 2023, presented with right sided weakness and HA received TNK, MRI brain was negative at that time. Symptoms thought to be 2/2 hypertensive encephalopathy. She reports previous history of migraines. She reports since November she has been out of work, she was the  at [employer], she tried to go back to work but felt too stressed out so she quit. She was going to go back to work today, but is here now. 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 Home medications were aspirin, labetalol, losartan, gabapentin and prozac.      No past medical history on file.    No past surgical history on file.    Review of patient's allergies indicates:   Allergen Reactions    Sumatriptan Nausea And  Vomiting       Current Neurological Medications:     No current facility-administered medications on file prior to encounter.     No current outpatient medications on file prior to encounter.     Family History    None       Tobacco Use    Smoking status: Not on file    Smokeless tobacco: Not on file   Substance and Sexual Activity    Alcohol use: Not on file    Drug use: Not on file    Sexual activity: Not on file     Review of Systems   Neurological:  Positive for speech difficulty and numbness. Negative for dizziness, tremors, seizures, syncope, facial asymmetry, weakness, light-headedness (right sided numbness) and headaches.   All other systems reviewed and are negative.    Objective:     Vital Signs (Most Recent):  Temp: 97.6 °F (36.4 °C) (02/09/24 0015)  Pulse: 77 (02/09/24 0603)  Resp: 12 (02/09/24 0603)  BP: 109/86 (02/09/24 0603)  SpO2: 100 % (02/09/24 0603) Vital Signs (24h Range):  Temp:  [97.6 °F (36.4 °C)] 97.6 °F (36.4 °C)  Pulse:  [66-80] 77  Resp:  [11-18] 12  SpO2:  [100 %] 100 %  BP: (109-140)/(81-93) 109/86        Body mass index is 38.32 kg/m².     Physical Exam  Vitals and nursing note reviewed.   Constitutional:       General: She is not in acute distress.     Appearance: She is not toxic-appearing.   HENT:      Head: Normocephalic.   Eyes:      General: No visual field deficit.     Pupils: Pupils are equal, round, and reactive to light.   Cardiovascular:      Rate and Rhythm: Normal rate.   Pulmonary:      Effort: Pulmonary effort is normal.   Musculoskeletal:         General: Normal range of motion.      Cervical back: Normal range of motion.      Right lower leg: No edema.      Left lower leg: No edema.   Skin:     General: Skin is warm and dry.      Capillary Refill: Capillary refill takes less than 2 seconds.   Neurological:      Mental Status: She is alert and oriented to person, place, and time.      Cranial Nerves: No dysarthria (pressured speech, but easily understood) or facial  asymmetry.      Sensory: Sensory deficit (decreased sensation to RUE, RLE, right face) present.      Motor: No weakness, tremor, atrophy, abnormal muscle tone, seizure activity or pronator drift.      Coordination: Coordination normal. Finger-Nose-Finger Test normal.   Psychiatric:         Behavior: Behavior is cooperative.          NEUROLOGICAL EXAMINATION:     MENTAL STATUS   Oriented to person, place, and time.     CRANIAL NERVES     CN III, IV, VI   Pupils are equal, round, and reactive to light.    GAIT AND COORDINATION      Coordination   Finger to nose coordination: normal      Significant Labs:   Recent Lab Results  (Last 5 results in the past 24 hours)        02/09/24  0610   02/09/24  0032   02/09/24  0029   02/09/24  0027   02/09/24  0023        Albumin/Globulin Ratio 1.0   0.8             Albumin 3.6   3.8             ALP 80   80             ALT 14   16             AST 14   18             Baso # 0.03   0.03             Basophil % 0.4   0.4             BILIRUBIN TOTAL 0.4   0.2             BUN 13.2   16.6             Calcium 9.0   9.5             Chloride 106   104             CO2 22   25             Creatinine 0.76   0.87             eGFR >60   >60             Eos # 0.31   0.33             Eos % 4.4   4.1             Globulin, Total 3.7   4.5             Glucose 84   87             Hematocrit 32.1   31.9             Hemoglobin 9.9   9.7             Immature Grans (Abs) 0.02   0.02             Immature Granulocytes 0.3   0.2             INR   1.0             Lymph # 2.10   2.57             LYMPH % 30.1   31.6             MCH 24.9   24.5             MCHC 30.8   30.4             MCV 80.7   80.6             Mono # 0.49   0.54             Mono % 7.0   6.6             MPV 10.2   10.2             Neut # 4.03   4.64             Neut % 57.8   57.1             nRBC 0.0   0.0             Platelet Count 268   279             POC Anion Gap       12         POC BUN       17         POC Chloride       100         POC  Creatinine       0.8         POC Glucose       93         POC Hematocrit       35         POC HEMOGLOBIN       12         POC Ionized Calcium       1.22         Potassium, Blood Gas       3.6         POC PTINR     1.3           POC PTWBT     15.0           Sodium, Blood Gas       139         POC TCO2 (MEASURED)       31         POCT Glucose         88       Potassium 3.7   3.6             PROTEIN TOTAL 7.3   8.3             PT   13.1             RBC 3.98   3.96             RDW 14.4   14.4             Sample     VENOUS   VENOUS         Sodium 137   138             TSH   1.172             WBC 6.98   8.13                                    Significant Imaging: I have reviewed all pertinent imaging results/findings within the past 24 hours.  Assessment and Plan:     Right sided numbness  -presented with right sided numbness and HA  -Stroke RF: HTN      Stroke workup  -CT head: negative  -CTA head and neck: no Lvo or flow limiting stenosis  -TSH: 1.172      Plan  -continue stroke workup including MRI brain  -permissive HTN for now  -was on aspirin 81 mg at home  -continue aspirin 81 mg and atorvastatin 80 mg   -bedrest, symptom onset  1530 on 2/8         VTE Risk Mitigation (From admission, onward)           Ordered     IP VTE HIGH RISK PATIENT  Once         02/09/24 0256     Place sequential compression device  Until discontinued         02/09/24 0256                    Thank you for your consult.  Further recommendations to follow from MD.     Lulu Borrego, NP  Neurology  [facility] - Emergency Dept

## 2024-02-09 NOTE — H&P
[facility] - Neurology    History & Physical      Patient Name: Trish Hale  MRN: [id number]  Admission Date: 2/9/2024  Attending Physician: Jaylen Kaur MD   Primary Care Provider: Wendy Babin MD         Patient information was obtained from ER records.     Subjective:     Principal Problem:<principal problem not specified>    Chief Complaint:   Chief Complaint   Patient presents with    Cerebrovascular Accident     RIGHT SIDED WEAKNESS/NUMBNESS/DEC SENSATION/SLURRED SPEECH ONSET APPROX 2230 TONIGHT. HX OF CVA IN NOVEMBER 2023 WITH RESIDUAL RIGHT SIDED DEFICITS. GCS15        HPI: 28 y.o. Black or  female with a past medical history of hypertension anxiety neuropathy cva presented to the ed with complaints of worsening right hand weakness and numbness and subsequently admitted for cva work up with neuro consult    No past medical history on file.    No past surgical history on file.    Review of patient's allergies indicates:   Allergen Reactions    Sumatriptan Nausea And Vomiting       No current facility-administered medications on file prior to encounter.     Current Outpatient Medications on File Prior to Encounter   Medication Sig    amLODIPine (NORVASC) 10 MG tablet Take 10 mg by mouth once daily.    aspirin (ECOTRIN) 81 MG EC tablet Take 81 mg by mouth once daily.    atorvastatin (LIPITOR) 40 MG tablet Take 40 mg by mouth every evening.    ergocalciferol (ERGOCALCIFEROL) 50,000 unit Cap Take 50,000 Units by mouth every 7 days.    gabapentin (NEURONTIN) 300 MG capsule Take 300 mg by mouth 2 (two) times daily.    labetaloL (NORMODYNE) 200 MG tablet Take 200 mg by mouth 2 (two) times daily.    losartan-hydrochlorothiazide 100-12.5 mg (HYZAAR) 100-12.5 mg Tab Take 1 tablet by mouth once daily.    paroxetine (PAXIL) 30 MG tablet Take 30 mg by mouth every evening.    valACYclovir (VALTREX) 1000 MG tablet Take 1,000 mg by mouth 2 (two) times daily.     Family  History    None       Tobacco Use    Smoking status: Not on file    Smokeless tobacco: Not on file   Substance and Sexual Activity    Alcohol use: Not on file    Drug use: Not on file    Sexual activity: Not on file     Review of Systems   Constitutional: Negative.    HENT: Negative.     Eyes: Negative.    Respiratory: Negative.     Cardiovascular: Negative.    Gastrointestinal: Negative.    Endocrine: Negative.    Genitourinary: Negative.    Musculoskeletal: Negative.    Skin: Negative.    Allergic/Immunologic: Negative.  Food allergies: manthena.   Neurological:  Positive for weakness.   Hematological: Negative.    Psychiatric/Behavioral: Negative.       Objective:     Vital Signs (Most Recent):  Temp: 98.1 °F (36.7 °C) (02/09/24 1250)  Pulse: 77 (02/09/24 1250)  Resp: 18 (02/09/24 1250)  BP: 118/81 (02/09/24 1250)  SpO2: 98 % (02/09/24 1250) Vital Signs (24h Range):  Temp:  [97.6 °F (36.4 °C)-98.1 °F (36.7 °C)] 98.1 °F (36.7 °C)  Pulse:  [66-85] 77  Resp:  [11-18] 18  SpO2:  [98 %-100 %] 98 %  BP: (109-140)/(67-93) 118/81     Weight: 114.3 kg (252 lb)  Body mass index is 38.32 kg/m².    Physical Exam  Vitals reviewed.   Constitutional:       Appearance: Normal appearance.   HENT:      Head: Normocephalic and atraumatic.      Right Ear: Tympanic membrane and external ear normal.      Nose: Nose normal.      Mouth/Throat:      Mouth: Mucous membranes are moist.   Eyes:      Extraocular Movements: Extraocular movements intact.      Pupils: Pupils are equal, round, and reactive to light.   Cardiovascular:      Rate and Rhythm: Normal rate and regular rhythm.      Pulses: Normal pulses.      Heart sounds: Normal heart sounds.   Pulmonary:      Effort: Pulmonary effort is normal.      Breath sounds: Normal breath sounds.   Abdominal:      General: Abdomen is flat. Bowel sounds are normal.      Palpations: Abdomen is soft.   Musculoskeletal:         General: Normal range of motion.      Cervical back: Normal range of  motion and neck supple.   Skin:     General: Skin is warm and dry.   Neurological:      General: No focal deficit present.      Mental Status: She is alert and oriented to person, place, and time.      Motor: Weakness present.   Psychiatric:         Mood and Affect: Mood normal.         Behavior: Behavior normal.           CRANIAL NERVES     CN III, IV, VI   Pupils are equal, round, and reactive to light.      Significant Labs: All pertinent labs within the past 24 hours have been reviewed.  Lab Results   Component Value Date    WBC 6.98 02/09/2024    HGB 9.9 (L) 02/09/2024    HCT 32.1 (L) 02/09/2024    MCV 80.7 02/09/2024     02/09/2024         Recent Labs   Lab 02/09/24  0610      K 3.7   CO2 22   BUN 13.2   CREATININE 0.76   GLUCOSE 84   CALCIUM 9.0       Significant Imaging: I have reviewed all pertinent imaging results/findings within the past 24 hours.    Assessment/Plan:     Active Diagnoses:    Diagnosis Date Noted POA    Right sided numbness [R20.0] 02/09/2024 Yes      Problems Resolved During this Admission:     VTE Risk Mitigation (From admission, onward)           Ordered     IP VTE HIGH RISK PATIENT  Once         02/09/24 0256     Place sequential compression device  Until discontinued         02/09/24 0256                  Potential cva  Neuropathy  anxiety  H/o Acute CVA status post TNK   Hypertensive emergency  Tobacco use  Normocytic anemia, stable      Plan:  - Continue recommendations of Neurology   - Follow results of MRI of the brain  - Continue with physical occupational and speech therapy   - Resume appropriate home medications when deemed necessary   - Labs in AM  -scds for dvt ppx    Jaylen Kaur MD  Department of Hospital Medicine   [facility] - Neurology

## 2024-02-09 NOTE — SUBJECTIVE & OBJECTIVE
No past medical history on file.    No past surgical history on file.    Review of patient's allergies indicates:   Allergen Reactions    Sumatriptan Nausea And Vomiting       Current Neurological Medications:     No current facility-administered medications on file prior to encounter.     No current outpatient medications on file prior to encounter.     Family History    None       Tobacco Use    Smoking status: Not on file    Smokeless tobacco: Not on file   Substance and Sexual Activity    Alcohol use: Not on file    Drug use: Not on file    Sexual activity: Not on file     Review of Systems   Neurological:  Positive for speech difficulty and numbness. Negative for dizziness, tremors, seizures, syncope, facial asymmetry, weakness, light-headedness (right sided numbness) and headaches.   All other systems reviewed and are negative.    Objective:     Vital Signs (Most Recent):  Temp: 97.6 °F (36.4 °C) (02/09/24 0015)  Pulse: 77 (02/09/24 0603)  Resp: 12 (02/09/24 0603)  BP: 109/86 (02/09/24 0603)  SpO2: 100 % (02/09/24 0603) Vital Signs (24h Range):  Temp:  [97.6 °F (36.4 °C)] 97.6 °F (36.4 °C)  Pulse:  [66-80] 77  Resp:  [11-18] 12  SpO2:  [100 %] 100 %  BP: (109-140)/(81-93) 109/86        Body mass index is 38.32 kg/m².     Physical Exam  Vitals and nursing note reviewed.   Constitutional:       General: She is not in acute distress.     Appearance: She is not toxic-appearing.   HENT:      Head: Normocephalic.   Eyes:      General: No visual field deficit.     Pupils: Pupils are equal, round, and reactive to light.   Cardiovascular:      Rate and Rhythm: Normal rate.   Pulmonary:      Effort: Pulmonary effort is normal.   Musculoskeletal:         General: Normal range of motion.      Cervical back: Normal range of motion.      Right lower leg: No edema.      Left lower leg: No edema.   Skin:     General: Skin is warm and dry.      Capillary Refill: Capillary refill takes less than 2 seconds.   Neurological:       Mental Status: She is alert and oriented to person, place, and time.      Cranial Nerves: No dysarthria (pressured speech) or facial asymmetry.      Sensory: Sensory deficit (decreased sensation to RUE, RLE, right face) present.      Motor: No weakness, tremor, atrophy, abnormal muscle tone, seizure activity or pronator drift.      Coordination: Coordination normal. Finger-Nose-Finger Test normal.   Psychiatric:         Behavior: Behavior is cooperative.          NEUROLOGICAL EXAMINATION:     MENTAL STATUS   Oriented to person, place, and time.     CRANIAL NERVES     CN III, IV, VI   Pupils are equal, round, and reactive to light.    GAIT AND COORDINATION      Coordination   Finger to nose coordination: normal      Significant Labs:   Recent Lab Results  (Last 5 results in the past 24 hours)        02/09/24  0610   02/09/24  0032   02/09/24  0029   02/09/24  0027   02/09/24  0023        Albumin/Globulin Ratio 1.0   0.8             Albumin 3.6   3.8             ALP 80   80             ALT 14   16             AST 14   18             Baso # 0.03   0.03             Basophil % 0.4   0.4             BILIRUBIN TOTAL 0.4   0.2             BUN 13.2   16.6             Calcium 9.0   9.5             Chloride 106   104             CO2 22   25             Creatinine 0.76   0.87             eGFR >60   >60             Eos # 0.31   0.33             Eos % 4.4   4.1             Globulin, Total 3.7   4.5             Glucose 84   87             Hematocrit 32.1   31.9             Hemoglobin 9.9   9.7             Immature Grans (Abs) 0.02   0.02             Immature Granulocytes 0.3   0.2             INR   1.0             Lymph # 2.10   2.57             LYMPH % 30.1   31.6             MCH 24.9   24.5             MCHC 30.8   30.4             MCV 80.7   80.6             Mono # 0.49   0.54             Mono % 7.0   6.6             MPV 10.2   10.2             Neut # 4.03   4.64             Neut % 57.8   57.1             nRBC 0.0   0.0              Platelet Count 268   279             POC Anion Gap       12         POC BUN       17         POC Chloride       100         POC Creatinine       0.8         POC Glucose       93         POC Hematocrit       35         POC HEMOGLOBIN       12         POC Ionized Calcium       1.22         Potassium, Blood Gas       3.6         POC PTINR     1.3           POC PTWBT     15.0           Sodium, Blood Gas       139         POC TCO2 (MEASURED)       31         POCT Glucose         88       Potassium 3.7   3.6             PROTEIN TOTAL 7.3   8.3             PT   13.1             RBC 3.98   3.96             RDW 14.4   14.4             Sample     VENOUS   VENOUS         Sodium 137   138             TSH   1.172             WBC 6.98   8.13                                    Significant Imaging: I have reviewed all pertinent imaging results/findings within the past 24 hours.

## 2024-02-09 NOTE — CLINICAL REVIEW
28-year-old female admitted with right-sided weakness, paresthesia.  History of hypertension, previous CVA.  On admission, hypertensive in the 140s.  Admitted for acute CVA rule out.  Initial CT angiogram of the head showed no LV 0.  Initial CT scan of the head unremarkable.  Pending MRI.  On stroke protocol.  Given the young age, previous CVA history at which point she had received thrombolytics, however, with a negative MRI, would favor IP LOC.  Would allow the disease process to progress prior to bed augmentation.  Will pursue peer to peer         Leyda Johnston MD  Utilization Management  Physician Advisor  [facility]  02/09/2024

## 2024-02-09 NOTE — PLAN OF CARE
02/09/24 1037   Discharge Assessment   Assessment Type Discharge Planning Assessment   Confirmed/corrected address, phone number and insurance Yes   Confirmed Demographics Correct on Facesheet   Source of Information patient   When was your last doctors appointment?   (Dr Wendy Babin)   Communicated HOMA with patient/caregiver Date not available/Unable to determine   Reason For Admission Acute Focal neurologicakl deficit, Weakness   People in Home child(chioma), dependent  (4 children)   Do you expect to return to your current living situation? Yes   Do you have help at home or someone to help you manage your care at home? No   Prior to hospitilization cognitive status: Alert/Oriented   Current cognitive status: Alert/Oriented   Walking or Climbing Stairs Difficulty no   Mobility Management Has 3 steps with rails to get into home   Dressing/Bathing Difficulty no   Do you have any problems with:   (does own shopping and errands)   Equipment Currently Used at Home none   Readmission within 30 days? No   Patient currently being followed by outpatient case management? No   Do you currently have service(s) that help you manage your care at home? No   Do you take prescription medications? Yes   Do you have prescription coverage? Yes   Coverage Medicaid  - [facility]   Do you have any problems affording any of your prescribed medications? No   Is the patient taking medications as prescribed? yes   How do you get to doctors appointments? car, drives self   Are you on dialysis? No   Do you take coumadin? No   Discharge Plan A Home Health;Home with family;Home   Discharge Plan B Home with family;Home   DME Needed Upon Discharge  none   Discharge Plan discussed with: Patient   Transition of Care Barriers None   Physical Activity   On average, how many days per week do you engage in moderate to strenuous exercise (like a brisk walk)? 0 days   On average, how many minutes do you engage in exercise at this  level? 0 min   Housing Stability   In the last 12 months, how many places have you lived? 1   In the last 12 months, was there a time when you did not have a steady place to sleep or slept in a shelter (including now)? N   Transportation Needs   In the past 12 months, has lack of transportation kept you from medical appointments or from getting medications? no   In the past 12 months, has lack of transportation kept you from meetings, work, or from getting things needed for daily living? No   Food Insecurity   Within the past 12 months, you worried that your food would run out before you got the money to buy more. Never true   Within the past 12 months, the food you bought just didn't last and you didn't have money to get more. Never true   Stress   Do you feel stress - tense, restless, nervous, or anxious, or unable to sleep at night because your mind is troubled all the time - these days? Only a littl   Social Connections   In a typical week, how many times do you talk on the phone with family, friends, or neighbors? Three   How often do you get together with friends or relatives? Three times   How often do you attend Orthodox or Oriental orthodox services? Never   Do you belong to any clubs or organizations such as Orthodox groups, unions, fraternal or athletic groups, or school groups? No   How often do you attend meetings of the clubs or organizations you belong to? Never   Are you , , , , never , or living with a partner? Never marrie   Alcohol Use   Q1: How often do you have a drink containing alcohol? 2-4 pr month   Q2: How many drinks containing alcohol do you have on a typical day when you are drinking? 1 or 2   Q3: How often do you have six or more drinks on one occasion? Never   OTHER   Name(s) of People in Home 4 dependent children

## 2024-02-09 NOTE — NURSING
Nurses Note -- 4 Eyes      2/9/2024   12:51 PM      Skin assessed during: Admit      [x] No Altered Skin Integrity Present    []Prevention Measures Documented      [] Yes- Altered Skin Integrity Present or Discovered   [] LDA Added if Not in Epic (Describe Wound)   [] New Altered Skin Integrity was Present on Admit and Documented in LDA   [] Wound Image Taken    Wound Care Consulted? No    Attending Nurse:  Elda Nguyen RN/Staff Member:   Mauricio

## 2024-02-10 LAB
ALBUMIN SERPL-MCNC: 3.5 G/DL (ref 3.5–5)
ALBUMIN/GLOB SERPL: 1 RATIO (ref 1.1–2)
ALP SERPL-CCNC: 82 UNIT/L (ref 40–150)
ALT SERPL-CCNC: 13 UNIT/L (ref 0–55)
AST SERPL-CCNC: 14 UNIT/L (ref 5–34)
BASOPHILS # BLD AUTO: 0.02 X10(3)/MCL
BASOPHILS NFR BLD AUTO: 0.3 %
BILIRUB SERPL-MCNC: 0.2 MG/DL
BUN SERPL-MCNC: 16.9 MG/DL (ref 7–18.7)
CALCIUM SERPL-MCNC: 9 MG/DL (ref 8.4–10.2)
CHLORIDE SERPL-SCNC: 104 MMOL/L (ref 98–107)
CO2 SERPL-SCNC: 24 MMOL/L (ref 22–29)
CREAT SERPL-MCNC: 0.86 MG/DL (ref 0.55–1.02)
EOSINOPHIL # BLD AUTO: 0.35 X10(3)/MCL (ref 0–0.9)
EOSINOPHIL NFR BLD AUTO: 4.7 %
ERYTHROCYTE [DISTWIDTH] IN BLOOD BY AUTOMATED COUNT: 14.6 % (ref 11.5–17)
GFR SERPLBLD CREATININE-BSD FMLA CKD-EPI: >60 MLS/MIN/1.73/M2
GLOBULIN SER-MCNC: 3.6 GM/DL (ref 2.4–3.5)
GLUCOSE SERPL-MCNC: 93 MG/DL (ref 74–100)
HCT VFR BLD AUTO: 31 % (ref 37–47)
HGB BLD-MCNC: 9.5 G/DL (ref 12–16)
IMM GRANULOCYTES # BLD AUTO: 0.02 X10(3)/MCL (ref 0–0.04)
IMM GRANULOCYTES NFR BLD AUTO: 0.3 %
LYMPHOCYTES # BLD AUTO: 2.26 X10(3)/MCL (ref 0.6–4.6)
LYMPHOCYTES NFR BLD AUTO: 30.6 %
MCH RBC QN AUTO: 24.9 PG (ref 27–31)
MCHC RBC AUTO-ENTMCNC: 30.6 G/DL (ref 33–36)
MCV RBC AUTO: 81.4 FL (ref 80–94)
MONOCYTES # BLD AUTO: 0.56 X10(3)/MCL (ref 0.1–1.3)
MONOCYTES NFR BLD AUTO: 7.6 %
NEUTROPHILS # BLD AUTO: 4.17 X10(3)/MCL (ref 2.1–9.2)
NEUTROPHILS NFR BLD AUTO: 56.5 %
NRBC BLD AUTO-RTO: 0 %
PLATELET # BLD AUTO: 266 X10(3)/MCL (ref 130–400)
PMV BLD AUTO: 10.4 FL (ref 7.4–10.4)
POTASSIUM SERPL-SCNC: 3.9 MMOL/L (ref 3.5–5.1)
PROT SERPL-MCNC: 7.1 GM/DL (ref 6.4–8.3)
RBC # BLD AUTO: 3.81 X10(6)/MCL (ref 4.2–5.4)
SODIUM SERPL-SCNC: 137 MMOL/L (ref 136–145)
WBC # SPEC AUTO: 7.38 X10(3)/MCL (ref 4.5–11.5)

## 2024-02-10 PROCEDURE — 85025 COMPLETE CBC W/AUTO DIFF WBC: CPT | Performed by: INTERNAL MEDICINE

## 2024-02-10 PROCEDURE — 25000003 PHARM REV CODE 250: Performed by: INTERNAL MEDICINE

## 2024-02-10 PROCEDURE — 21400001 HC TELEMETRY ROOM

## 2024-02-10 PROCEDURE — G0378 HOSPITAL OBSERVATION PER HR: HCPCS

## 2024-02-10 PROCEDURE — 80053 COMPREHEN METABOLIC PANEL: CPT | Performed by: INTERNAL MEDICINE

## 2024-02-10 RX ADMIN — Medication 6 MG: at 02:02

## 2024-02-10 RX ADMIN — ACETAMINOPHEN 650 MG: 325 TABLET, FILM COATED ORAL at 01:02

## 2024-02-10 RX ADMIN — ATORVASTATIN CALCIUM 40 MG: 40 TABLET, FILM COATED ORAL at 08:02

## 2024-02-10 RX ADMIN — ASPIRIN 81 MG: 81 TABLET, COATED ORAL at 01:02

## 2024-02-10 RX ADMIN — GABAPENTIN 300 MG: 300 CAPSULE ORAL at 01:02

## 2024-02-10 RX ADMIN — LABETALOL HYDROCHLORIDE 200 MG: 200 TABLET, FILM COATED ORAL at 08:02

## 2024-02-10 RX ADMIN — Medication 6 MG: at 08:02

## 2024-02-10 RX ADMIN — GABAPENTIN 300 MG: 300 CAPSULE ORAL at 08:02

## 2024-02-10 RX ADMIN — LABETALOL HYDROCHLORIDE 200 MG: 200 TABLET, FILM COATED ORAL at 01:02

## 2024-02-10 NOTE — PROGRESS NOTES
Inpatient Nutrition Assessment    Admit Date: 2/9/2024   Total duration of encounter: 1 day   Patient Age: 28 y.o.    Nutrition Recommendation/Prescription     Continue current diet (Diet Adult Regular) as tolerated.     Communication of Recommendations: reviewed with patient    Nutrition Assessment     Chart Review    Reason Seen: malnutrition screening tool (MST)    Malnutrition Screening Tool Results   Have you recently lost weight without trying?: Unsure  Have you been eating poorly because of a decreased appetite?: No   MST Score: 2   Diagnosis:  Potential CVA  Anxiety   Hypertensive emergency  Tobacco use  Normocytic anemia - stable    Relevant Medical History: hypertension, anxiety, neuropathy, CVA    Scheduled Medications:  amLODIPine, 10 mg, Daily  aspirin, 81 mg, Daily  atorvastatin, 40 mg, QHS  gabapentin, 300 mg, BID  labetaloL, 200 mg, BID  paroxetine, 30 mg, QHS    Continuous Infusions: none       PRN Medications: acetaminophen, melatonin, sodium chloride 0.9%    Calorie Containing IV Medications: no significant kcals from medications at this time    Recent Labs   Lab 02/09/24  0027 02/09/24  0032 02/09/24  0610 02/10/24  0503   NA  --  138 137 137   K  --  3.6 3.7 3.9   CALCIUM  --  9.5 9.0 9.0   CHLORIDE  --  104 106 104   CO2  --  25 22 24   BUN  --  16.6 13.2 16.9   CREATININE  --  0.87 0.76 0.86   EGFRNORACEVR  --  >60 >60 >60   GLUCOSE  --  87 84 93   BILITOT  --  0.2 0.4 0.2   ALKPHOS  --  80 80 82   ALT  --  16 14 13   AST  --  18 14 14   ALBUMIN  --  3.8 3.6 3.5   WBC  --  8.13 6.98 7.38   HGB  --  9.7* 9.9* 9.5*   HCT 35* 31.9* 32.1* 31.0*     Nutrition Orders:  Diet Adult Regular    Appetite/Oral Intake: good/% of meals  Factors Affecting Nutritional Intake: none identified  Food/Samaritan/Cultural Preferences: none reported  Food Allergies: none reported  Last Bowel Movement: 02/09/24  Wound(s): no pressure injuries documented at this time     Comments    2/10/24 Patient reports a  "good appetite and denies weight loss.    Anthropometrics    Height: 5' 8" (172.7 cm), Height Method: Stated  Last Weight: 114.3 kg (252 lb) (24 1250), Weight Method: Bed Scale  BMI (Calculated): 38.3  BMI Classification: obese grade II (BMI 35-39.9)     Ideal Body Weight (IBW), Female: 140 lb     % Ideal Body Weight, Female (lb): 180 %                    Usual Body Weight (UBW), k.4 kg  % Usual Body Weight: 101.01     Usual Weight Provided By: patient    Wt Readings from Last 5 Encounters:   24 114.3 kg (252 lb)   23 114.3 kg (252 lb)   23 111.1 kg (244 lb 14.9 oz)   18 116 kg (255 lb 11.7 oz)     Weight Change(s) Since Admission: new admit  Wt Readings from Last 1 Encounters:   24 1250 114.3 kg (252 lb)   Admit Weight: 114.3 kg (252 lb) (24 1250), Weight Method: Bed Scale    Nutrition Goals & Monitoring     Dietitian will monitor: food and beverage intake and weight    Nutrition Risk/Follow-Up: low (follow-up in 5-7 days)   Please consult if re-assessment needed sooner.     "

## 2024-02-10 NOTE — PROGRESS NOTES
[facility] - Neurology Hospital Medicine  Progress Note    Patient Name: Trish Hale  MRN: [id number]  Patient Class: IP- Inpatient   Admission Date: 2/9/2024  Length of Stay: 1 days  Attending Physician: Jaylen Kaur MD  Primary Care Provider: Wendy Babin MD        Subjective:     Principal Problem:Stroke    Interval History:   Today's info : seen and examined, no acute events overnight. Continues to improve   Mri neg  H and h dropped    Review of Systems   Constitutional:  Positive for fatigue.   HENT:  Positive for trouble swallowing and voice change.    Eyes: Negative.    Respiratory:  Positive for shortness of breath.    Cardiovascular:  Positive for palpitations.   Gastrointestinal: Negative.    Endocrine: Negative.    Genitourinary: Negative.    Musculoskeletal: Negative.    Skin: Negative.    Allergic/Immunologic: Negative.    Neurological:  Positive for weakness.   Hematological: Negative.    Psychiatric/Behavioral:  The patient is nervous/anxious.      Objective:     Vital Signs (Most Recent):  Temp: 98.1 °F (36.7 °C) (02/10/24 0700)  Pulse: 84 (02/10/24 0700)  Resp: 17 (02/09/24 2007)  BP: 111/73 (02/10/24 0700)  SpO2: 99 % (02/10/24 0700) Vital Signs (24h Range):  Temp:  [97.4 °F (36.3 °C)-98.1 °F (36.7 °C)] 98.1 °F (36.7 °C)  Pulse:  [74-99] 84  Resp:  [17] 17  SpO2:  [97 %-99 %] 99 %  BP: (111-117)/(68-76) 111/73     Weight: 114.3 kg (252 lb)  Body mass index is 38.32 kg/m².    Intake/Output Summary (Last 24 hours) at 2/10/2024 1430  Last data filed at 2/9/2024 1500  Gross per 24 hour   Intake --   Output 700 ml   Net -700 ml      Physical Exam  Vitals reviewed.   Constitutional:       Appearance: Normal appearance.   HENT:      Head: Normocephalic and atraumatic.      Right Ear: Tympanic membrane and external ear normal.      Nose: Nose normal.      Mouth/Throat:      Mouth: Mucous membranes are moist.   Eyes:      Extraocular Movements: Extraocular movements  intact.      Pupils: Pupils are equal, round, and reactive to light.   Cardiovascular:      Rate and Rhythm: Normal rate and regular rhythm.      Pulses: Normal pulses.      Heart sounds: Normal heart sounds.   Pulmonary:      Effort: Pulmonary effort is normal.      Breath sounds: Normal breath sounds.   Abdominal:      General: Abdomen is flat. Bowel sounds are normal.      Palpations: Abdomen is soft.   Musculoskeletal:         General: Normal range of motion.      Cervical back: Normal range of motion and neck supple.   Skin:     General: Skin is warm and dry.   Neurological:      General: No focal deficit present.      Mental Status: She is alert and oriented to person, place, and time.      Motor: Weakness present.   Psychiatric:         Mood and Affect: Mood normal.         Behavior: Behavior normal.           Overview/Hospital Course: stable    Significant Labs: All pertinent labs within the past 24 hours have been reviewed.  Lab Results   Component Value Date    WBC 7.38 02/10/2024    HGB 9.5 (L) 02/10/2024    HCT 31.0 (L) 02/10/2024    MCV 81.4 02/10/2024     02/10/2024           Recent Labs   Lab 02/10/24  0503      K 3.9   CO2 24   BUN 16.9   CREATININE 0.86   GLUCOSE 93   CALCIUM 9.0       Significant Imaging: I have reviewed all pertinent imaging results/findings within the past 24 hours.    Assessment/Plan:      Active Diagnoses:    Diagnosis Date Noted POA    PRINCIPAL PROBLEM:  Stroke [I63.9] 11/11/2023 Yes    Right sided numbness [R20.0] 02/09/2024 Yes      Problems Resolved During this Admission:     VTE Risk Mitigation (From admission, onward)           Ordered     IP VTE HIGH RISK PATIENT  Once         02/09/24 0256     Place sequential compression device  Until discontinued         02/09/24 0256                     Potential cva  Neuropathy  anxiety  H/o Acute CVA status post TNK   Hypertensive emergency  Tobacco use  Normocytic anemia, stable      Plan:  - Continue recommendations  of Neurology   - Continue with physical occupational and speech therapy   - Resume appropriate home medications when deemed necessary   - Labs in AM  -scds for dvt ppx  - monitor h and h   - ?placement    Jaylen Kaur MD  Department of Hospital Medicine   [facility] - Neurology

## 2024-02-11 LAB
ALBUMIN SERPL-MCNC: 3.4 G/DL (ref 3.5–5)
ALBUMIN/GLOB SERPL: 0.9 RATIO (ref 1.1–2)
ALP SERPL-CCNC: 80 UNIT/L (ref 40–150)
ALT SERPL-CCNC: 15 UNIT/L (ref 0–55)
AST SERPL-CCNC: 15 UNIT/L (ref 5–34)
BASOPHILS # BLD AUTO: 0.03 X10(3)/MCL
BASOPHILS NFR BLD AUTO: 0.4 %
BILIRUB SERPL-MCNC: 0.3 MG/DL
BUN SERPL-MCNC: 13.5 MG/DL (ref 7–18.7)
CALCIUM SERPL-MCNC: 9 MG/DL (ref 8.4–10.2)
CHLORIDE SERPL-SCNC: 108 MMOL/L (ref 98–107)
CO2 SERPL-SCNC: 23 MMOL/L (ref 22–29)
CREAT SERPL-MCNC: 0.69 MG/DL (ref 0.55–1.02)
EOSINOPHIL # BLD AUTO: 0.3 X10(3)/MCL (ref 0–0.9)
EOSINOPHIL NFR BLD AUTO: 4.1 %
ERYTHROCYTE [DISTWIDTH] IN BLOOD BY AUTOMATED COUNT: 14.6 % (ref 11.5–17)
GFR SERPLBLD CREATININE-BSD FMLA CKD-EPI: >60 MLS/MIN/1.73/M2
GLOBULIN SER-MCNC: 3.6 GM/DL (ref 2.4–3.5)
GLUCOSE SERPL-MCNC: 89 MG/DL (ref 74–100)
HCT VFR BLD AUTO: 30.6 % (ref 37–47)
HGB BLD-MCNC: 9.3 G/DL (ref 12–16)
IMM GRANULOCYTES # BLD AUTO: 0.03 X10(3)/MCL (ref 0–0.04)
IMM GRANULOCYTES NFR BLD AUTO: 0.4 %
LYMPHOCYTES # BLD AUTO: 1.97 X10(3)/MCL (ref 0.6–4.6)
LYMPHOCYTES NFR BLD AUTO: 26.9 %
MCH RBC QN AUTO: 24.6 PG (ref 27–31)
MCHC RBC AUTO-ENTMCNC: 30.4 G/DL (ref 33–36)
MCV RBC AUTO: 81 FL (ref 80–94)
MONOCYTES # BLD AUTO: 0.6 X10(3)/MCL (ref 0.1–1.3)
MONOCYTES NFR BLD AUTO: 8.2 %
NEUTROPHILS # BLD AUTO: 4.4 X10(3)/MCL (ref 2.1–9.2)
NEUTROPHILS NFR BLD AUTO: 60 %
NRBC BLD AUTO-RTO: 0 %
PLATELET # BLD AUTO: 271 X10(3)/MCL (ref 130–400)
PMV BLD AUTO: 10.6 FL (ref 7.4–10.4)
POTASSIUM SERPL-SCNC: 4.3 MMOL/L (ref 3.5–5.1)
PROT SERPL-MCNC: 7 GM/DL (ref 6.4–8.3)
RBC # BLD AUTO: 3.78 X10(6)/MCL (ref 4.2–5.4)
SODIUM SERPL-SCNC: 139 MMOL/L (ref 136–145)
WBC # SPEC AUTO: 7.33 X10(3)/MCL (ref 4.5–11.5)

## 2024-02-11 PROCEDURE — 85025 COMPLETE CBC W/AUTO DIFF WBC: CPT | Performed by: INTERNAL MEDICINE

## 2024-02-11 PROCEDURE — 80053 COMPREHEN METABOLIC PANEL: CPT | Performed by: INTERNAL MEDICINE

## 2024-02-11 PROCEDURE — 25000003 PHARM REV CODE 250: Performed by: INTERNAL MEDICINE

## 2024-02-11 PROCEDURE — G0378 HOSPITAL OBSERVATION PER HR: HCPCS

## 2024-02-11 PROCEDURE — 21400001 HC TELEMETRY ROOM

## 2024-02-11 RX ADMIN — ASPIRIN 81 MG: 81 TABLET, COATED ORAL at 08:02

## 2024-02-11 RX ADMIN — GABAPENTIN 300 MG: 300 CAPSULE ORAL at 08:02

## 2024-02-11 RX ADMIN — LABETALOL HYDROCHLORIDE 200 MG: 200 TABLET, FILM COATED ORAL at 09:02

## 2024-02-11 RX ADMIN — PAROXETINE HYDROCHLORIDE 30 MG: 20 TABLET, FILM COATED ORAL at 09:02

## 2024-02-11 RX ADMIN — ATORVASTATIN CALCIUM 40 MG: 40 TABLET, FILM COATED ORAL at 09:02

## 2024-02-11 RX ADMIN — GABAPENTIN 300 MG: 300 CAPSULE ORAL at 09:02

## 2024-02-11 NOTE — PROGRESS NOTES
[facility] Hospital Medicine  Progress Note    Patient Name: Trish Hale  MRN: [id number]  Patient Class: IP- Inpatient   Admission Date: 2/9/2024  Length of Stay: 2 days  Attending Physician: Jaylen Kaur MD  Primary Care Provider: Wendy Babin MD        Subjective:     Principal Problem:Stroke    Interval History:   Today's info : seen and examined, no acute events overnight. Continues to improve   Mri neg  H and h stable  Cm for placement    Review of Systems   Constitutional:  Positive for fatigue.   HENT:  Positive for trouble swallowing and voice change.    Eyes: Negative.    Respiratory:  Positive for shortness of breath.    Cardiovascular:  Positive for palpitations.   Gastrointestinal: Negative.    Endocrine: Negative.    Genitourinary: Negative.    Musculoskeletal: Negative.    Skin: Negative.    Allergic/Immunologic: Negative.    Neurological:  Positive for weakness.   Hematological: Negative.    Psychiatric/Behavioral:  The patient is nervous/anxious.      Objective:     Vital Signs (Most Recent):  Temp: 97.9 °F (36.6 °C) (02/11/24 0750)  Pulse: 81 (02/11/24 0750)  Resp: 18 (02/11/24 0750)  BP: 103/60 (02/11/24 0750)  SpO2: 99 % (02/11/24 0750) Vital Signs (24h Range):  Temp:  [97.8 °F (36.6 °C)-98.1 °F (36.7 °C)] 97.9 °F (36.6 °C)  Pulse:  [73-81] 81  Resp:  [18] 18  SpO2:  [96 %-99 %] 99 %  BP: (102-131)/(60-81) 103/60     Weight: 114.3 kg (252 lb)  Body mass index is 38.32 kg/m².  No intake or output data in the 24 hours ending 02/11/24 1335     Physical Exam  Vitals reviewed.   Constitutional:       Appearance: Normal appearance.   HENT:      Head: Normocephalic and atraumatic.      Right Ear: Tympanic membrane and external ear normal.      Nose: Nose normal.      Mouth/Throat:      Mouth: Mucous membranes are moist.   Eyes:      Extraocular Movements: Extraocular movements intact.      Pupils: Pupils are equal, round, and reactive to light.    Cardiovascular:      Rate and Rhythm: Normal rate and regular rhythm.      Pulses: Normal pulses.      Heart sounds: Normal heart sounds.   Pulmonary:      Effort: Pulmonary effort is normal.      Breath sounds: Normal breath sounds.   Abdominal:      General: Abdomen is flat. Bowel sounds are normal.      Palpations: Abdomen is soft.   Musculoskeletal:         General: Normal range of motion.      Cervical back: Normal range of motion and neck supple.   Skin:     General: Skin is warm and dry.   Neurological:      General: No focal deficit present.      Mental Status: She is alert and oriented to person, place, and time.      Motor: Weakness present.   Psychiatric:         Mood and Affect: Mood normal.         Behavior: Behavior normal.           Overview/Hospital Course: stable    Significant Labs: All pertinent labs within the past 24 hours have been reviewed.  Lab Results   Component Value Date    WBC 7.33 02/11/2024    HGB 9.3 (L) 02/11/2024    HCT 30.6 (L) 02/11/2024    MCV 81.0 02/11/2024     02/11/2024           Recent Labs   Lab 02/11/24  0432      K 4.3   CO2 23   BUN 13.5   CREATININE 0.69   GLUCOSE 89   CALCIUM 9.0         Significant Imaging: I have reviewed all pertinent imaging results/findings within the past 24 hours.    Assessment/Plan:      Active Diagnoses:    Diagnosis Date Noted POA    PRINCIPAL PROBLEM:  Stroke [I63.9] 11/11/2023 Yes    Right sided numbness [R20.0] 02/09/2024 Yes      Problems Resolved During this Admission:     VTE Risk Mitigation (From admission, onward)           Ordered     IP VTE HIGH RISK PATIENT  Once         02/09/24 0256     Place sequential compression device  Until discontinued         02/09/24 0256                     Potential cva  Neuropathy  anxiety  H/o Acute CVA status post TNK   Hypertensive emergency  Tobacco use  Normocytic anemia, stable      Plan:  - Continue recommendations of Neurology   - Continue with physical occupational and speech  therapy   - Resume appropriate home medications when deemed necessary   - Labs in AM  -scds for dvt ppx  - monitor h and h   - ?placement    Jaylen Kaur MD  Department of Hospital Medicine   [facility]

## 2024-02-12 VITALS
HEIGHT: 68 IN | BODY MASS INDEX: 38.19 KG/M2 | SYSTOLIC BLOOD PRESSURE: 124 MMHG | HEART RATE: 74 BPM | DIASTOLIC BLOOD PRESSURE: 68 MMHG | TEMPERATURE: 98 F | RESPIRATION RATE: 18 BRPM | OXYGEN SATURATION: 98 % | WEIGHT: 252 LBS

## 2024-02-12 PROBLEM — I63.9 STROKE: Status: RESOLVED | Noted: 2023-11-11 | Resolved: 2024-02-12

## 2024-02-12 PROCEDURE — 97165 OT EVAL LOW COMPLEX 30 MIN: CPT

## 2024-02-12 PROCEDURE — 97162 PT EVAL MOD COMPLEX 30 MIN: CPT

## 2024-02-12 PROCEDURE — G0378 HOSPITAL OBSERVATION PER HR: HCPCS

## 2024-02-12 PROCEDURE — 25000003 PHARM REV CODE 250: Performed by: INTERNAL MEDICINE

## 2024-02-12 RX ADMIN — ASPIRIN 81 MG: 81 TABLET, COATED ORAL at 08:02

## 2024-02-12 RX ADMIN — LABETALOL HYDROCHLORIDE 200 MG: 200 TABLET, FILM COATED ORAL at 08:02

## 2024-02-12 RX ADMIN — GABAPENTIN 300 MG: 300 CAPSULE ORAL at 08:02

## 2024-02-12 NOTE — DISCHARGE SUMMARY
[facility] Medicine  Discharge Summary      Patient Name: Trish Hale  MRN: [id number]  Admission Date: 2/9/2024  Hospital Length of Stay: 3 days  Discharge Date and Time:  02/12/2024 3:24 PM  Attending Physician: Jaylen Holt MD   Discharging Provider: Jaylen Holt MD  Discharge Provider Team: Networked reference to record PCT   Primary Care Provider: Wendy Babin MD        DC DIAGNOSES :    Potential cva  Neuropathy  anxiety  H/o Acute CVA status post TNK   Hypertensive emergency  Tobacco use  Normocytic anemia, stable        * No surgery found *      Hospital Course:    28 y.o. Black or  female with a past medical history of hypertension anxiety neuropathy cva presented to the ed with complaints of worsening right hand weakness and numbness and subsequently admitted for cva work up with neuro consult   STROKE WORK UP NEG  SYMPTOMS IMPROVING  NEURO CLEARED  DC HOME WITH HH AND OUT PT THERAPY WITH CLOSE PCPA ND NEURO FOLLOW UP    Consults:   Consults (From admission, onward)          Status Ordering Provider     Inpatient consult to Social Work/Case Management  Once        Provider:  (Not yet assigned)    Acknowledged JAYLEN HOLT     Inpatient consult to Neurology  Once        Provider:  Napoleon Zhao MD    Completed FRANCI PERRY            Final Active Diagnoses:    Diagnosis Date Noted POA    Right sided numbness [R20.0] 02/09/2024 Yes      Problems Resolved During this Admission:    Diagnosis Date Noted Date Resolved POA    PRINCIPAL PROBLEM:  Stroke [I63.9] 11/11/2023 02/12/2024 Yes      Discharged Condition: fair    [place]position: [place] [facility] [place]    Follow Up:   Follow-up Information       Wendy Babin MD Follow up in 1 week(s).    Specialty: Internal Medicine  Contact information:  [address] [place] [id number] [phone]                           Patient Instructions:      Diet Adult Regular      Activity as tolerated     Medications:  Reconciled Home Medications:      Medication List        CONTINUE taking these medications      amLODIPine 10 MG tablet  Commonly known as: NORVASC  Take 10 mg by mouth once daily.     aspirin 81 MG EC tablet  Commonly known as: ECOTRIN  Take 81 mg by mouth once daily.     atorvastatin 40 MG tablet  Commonly known as: LIPITOR  Take 40 mg by mouth every evening.     ergocalciferol 50,000 unit Cap  Commonly known as: ERGOCALCIFEROL  Take 50,000 Units by mouth every 7 days.     gabapentin 300 MG capsule  Commonly known as: NEURONTIN  Take 300 mg by mouth 2 (two) times daily.     labetaloL 200 MG tablet  Commonly known as: NORMODYNE  Take 200 mg by mouth 2 (two) times daily.     losartan-hydrochlorothiazide 100-12.5 mg 100-12.5 mg Tab  Commonly known as: HYZAAR  Take 1 tablet by mouth once daily.     paroxetine 30 MG tablet  Commonly known as: PAXIL  Take 30 mg by mouth every evening.     valACYclovir 1000 MG tablet  Commonly known as: VALTREX  Take 1,000 mg by mouth 2 (two) times daily.              Significant Diagnostic Studies: Labs: BMP:   Recent Labs   Lab 02/11/24  0432      K 4.3   CO2 23   BUN 13.5   CREATININE 0.69   CALCIUM 9.0       Pending Diagnostic Studies:       Procedure Component Value Units Date/Time    Fluoxetine (Prozac) [[id number]] Collected: 02/09/24 1333    Order Status: Sent Lab Status: In process Updated: 02/09/24 1405    Specimen: Blood           Indwelling Lines/Drains at time of discharge:   Lines/Drains/Airways       None                   Time spent on the discharge of patient: 32 minutes         Jaylen Kaur MD  Department of Hospital Medicine  [facility]

## 2024-02-12 NOTE — PT/OT/SLP EVAL
"Physical Therapy Evaluation and Discharge Note    Patient Name:  Trish Hale   MRN:  [id number]    Recommendations:     Discharge therapy intensity: Low Intensity Therapy (Pt previously established with outpatient services at [facility])   Discharge Equipment Recommendations: none   Barriers to discharge: None    Assessment:     Trish Hale is a 28 y.o. female admitted for stroke workup after presenting to ED with complaints of R sided weakness/numbness. Pt with history of CVA 11/23 per pt report with residual deficits she was completing outpatient PT services for. She report recent return to work has limited attendance with outpatient services and she is noticing decreased strength in R UE and LE.  At this time, patient is mobilizing independently without assistive device. She demonstrates mild coordination impairments during dynamic activity but this does not impact independence or stability. PT reviewed independent exercise and walking programs with pt, and recommended she request home program from outpatient therapist as well. No further skilled services indicated during acute admission and pt is safe for discharge home.    Recent Surgery: * No surgery found *      Plan:     During this hospitalization, patient does not require further acute PT services.  Please re-consult if situation changes.      Subjective     Chief Complaint: "heaviness" in R UE /LE  Patient/Family Comments/goals: return to normal (prior to 11/23)  Pain/Comfort:  Pain Rating 1: 0/10    Patients cultural, spiritual, Anabaptism conflicts given the current situation: no    Living Environment:  Pt lives at home with 4 young children. 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   Prior to admission, patients level of function was Independent.  Equipment used at home: none.  DME owned (not currently used): none.  Upon discharge, patient will have assistance from limited support from mother.    Objective: "     Communicated with nurse prior to session.  Patient found  lying prone in bed  with telemetry upon PT entry to room.    General Precautions: Standard,      Orthopedic Precautions:N/A   Braces: N/A  Respiratory Status: Room air  Blood Pressure: 152/91, 87    Exams:  Cognitive Exam:  Patient is oriented to Person, Place, Time, and Situation  Gross Motor Coordination:  WFL  RLE ROM: WFL  RLE Strength: WFL  LLE ROM: WFL  LLE Strength: WFL    Functional Mobility:  Bed Mobility:     Rolling Left:  independence  Rolling Right: independence  Supine to Sit: independence  Transfers:     Sit to Stand:  independence with no AD  Gait: 300ft without AD. Steady eriberto, no LOB. Mildly inconsistent R step length, but maintains step through gait pattern with good foot clearance.  Balance: WFL. Mild compensations noted during highest balance challenges but no LOB occurred, pt appropriately compensated with steppage strategy.  Tinetti Total Score: 24  < 18 = High Risk of Falls, 19-23 = Moderate Risk of Falls, > 24 = Low Risk of Falls    AM-PAC 6 CLICK MOBILITY  Total Score:24       Treatment and Education:  Reviewed importance of daily mobility and maintenance of home exercise program.    Patient provided with verbal education and demonstrations education regarding discharge/DME recommendations and home exercise program.  Understanding was verbalized.     Patient left ambulatory in room/cisse with all lines intact.    GOALS:   Multidisciplinary Problems       Physical Therapy Goals       Not on file                    History:     No past medical history on file.    No past surgical history on file.    Time Tracking:     PT Received On: 02/12/24  PT Start Time: 0940     PT Stop Time: 0958  PT Total Time (min): 18 min     Billable Minutes: Evaluation moderate      02/12/2024

## 2024-02-12 NOTE — PT/OT/SLP EVAL
"Occupational Therapy   Evaluation and Discharge Note    Name: Trish Hale  MRN: [id number]  Admitting Diagnosis: right sided headache  Recent Surgery: * No surgery found *      Recommendations:     Discharge therapy intensity: Low Intensity Therapy (pt reports she was going to be starting PT/OT with [facility] oupatient)   Discharge Equipment Recommendations: none  Barriers to discharge:  None    Assessment:     Trish Hale is a 28 y.o. female with a medical diagnosis of right sided numbness, admitted for stroke work up. Of note, pt with history of CVA in 11/23 and pt reports residual right sided weakness since. Pt reports she is established with [facility] therapy services for PT and was about to begin OT as well. On eval, patient able to doff/don socks, ambulate to bathroom and complete toilet transfer Independently. Pt with no LOB and no AD use. Pt presents with very mild RUE weakness as compared to left, but does not limit function. Skilled OT services are not warranted at this time.    Plan:     OT to sign off as acute OT services are not warranted at this time.  Please re-consult if situation changes during this hospitalization.    Plan of Care Reviewed with: patient    Subjective     Chief Complaint: "headache medication is wearing off"  Patient/Family Comments/goals: to go home today    Occupational Profile:  Living Environment: lives with 4 kids in [facility] with 3 HOOD  Previous level of function: Independent  Roles and Routines: mother, daughter, manager at [employer], drives  Equipment Used at Home: none  Assistance upon Discharge: mother assists as needed    Pain/Comfort:  Pain Rating 1: 0/10    Patients cultural, spiritual, Mormonism conflicts given the current situation: no    Objective:     OT communicated with RN prior to session.      Patient was found HOB elevated with peripheral IV, telemetry upon OT entry to room.    General Precautions: Standard,    Orthopedic Precautions: N/A  Braces: N/A    Vital " "Signs: Blood Pressure: 131/82  HR: 90  Respiratory Status: on room air    Bed Mobility:    Patient completed Supine to Sit with independence  Patient completed Sit to Supine with independence    Functional Mobility/Transfers:  Patient completed Sit <> Stand Transfer with independence  with  no assistive device   Patient completed Toilet Transfer Step Transfer technique with independence with  no AD  Functional Mobility: no LOB, independent with mobility using no AD    Activities of Daily Living:  Lower Body Dressing: independence ; pt donned jeans during PT session, able to doff/don socks Independently  Toileting: independence      Functional Cognition:  Orientation: oriented to Person, Place, Time, and Situation  Safety Awareness: WFL  Insight into Deficits: WFL    Visual Perceptual Skills:  Pursuits: WFL  Convergence/Divergence: WFL    Upper Extremity Function:  Right Upper Extremity:   Strength: shoulder 5/5, elbow flexion/extension 4+/5, wrist/digits 5/5  Coordination: WFL finger to nose and finger opposition  Sensation: WFL light touch, pt reports sensation "feels different/numb" compared to left    Left Upper Extremity:  Strength: 5/5  Coordination: WFL finger to nose and finger opposition  Sensation: WFL    Balance:   Static sitting balance: WFL  Dynamic sitting balance:WFL  Static standing balance:WFL  Dynamic standing balance:WFL    Therapeutic Positioning  Risk for acquired pressure injuries is decreased due to ability to mobilize independently .    OT interventions performed during the course of today's session in an effort to prevent and/or reduce acquired pressure injuries:   Education was provided on benefits of and recommendations for therapeutic positioning    OT recommendations for therapeutic positioning throughout hospitalization:   Follow [facility] Pressure Injury Prevention Protocol    Patient Education:  Patient provided with verbal education education regarding OT role/goals/POC, fall prevention, " safety awareness, and Discharge/DME recommendations.  Understanding was verbalized.     Patient left sitting edge of bed with all lines intact and call button in reach    History:     No past medical history on file.    No past surgical history on file.    Time Tracking:     OT Date of Treatment: 02/12/24  OT Start Time: 1100  OT Stop Time: 1111  OT Total Time (min): 11 min    Billable Minutes:Evaluation low complexity    2/12/2024

## 2024-02-12 NOTE — PROGRESS NOTES
Patient was not seen, chart reviewed      Right sided numbness  -presented with right sided numbness and HA  -Stroke RF: HTN        Stroke workup  -CT head: negative  -CTA head and neck: no Lvo or flow limiting stenosis  -TSH: 1.172  -MRI brain: negative   -ECHO from 11/11/23: BS negative, EF: 55-60%, LA normal      Plan  -ok to normalize BP  -continue aspirin 81 mg and atorvastatin 40 mg   -PT/OT/ST  -this is less likely a vascular neurology issue, not TIA with persistent symptoms and without, no evidence of infarct on MRI brain, not stroke  -could consider further workup with general neurology (if warranted) or psych consult...lengthy discussion with her in ED on admission- she reported little support and frequently feeling overwhelmed and stress with her children and her job.   -signing off from a stroke standpoint

## 2024-02-15 LAB
FLUOXETINE TROUGH SERPL-MCNC: <20 NG/ML
FLUOXETINE+NOR TROUGH SERPL-MCNC: NORMAL NG/ML (ref 120–500)
NORFLUOXETINE SERPL-MCNC: 21 NG/ML

## 2024-10-02 ENCOUNTER — HOSPITAL ENCOUNTER (EMERGENCY)
Facility: HOSPITAL | Age: 29
Discharge: HOME | End: 2024-10-03
Attending: EMERGENCY MEDICINE
Payer: MEDICAID

## 2024-10-02 DIAGNOSIS — R20.2 PARESTHESIA: ICD-10-CM

## 2024-10-02 DIAGNOSIS — R20.0 NUMBNESS: ICD-10-CM

## 2024-10-02 DIAGNOSIS — G43.809 OTHER MIGRAINE WITHOUT STATUS MIGRAINOSUS, NOT INTRACTABLE: Primary | ICD-10-CM

## 2024-10-02 LAB
ALBUMIN SERPL-MCNC: 3.5 G/DL (ref 3.5–5)
ALBUMIN/GLOB SERPL: 0.9 RATIO (ref 1.1–2)
ALP SERPL-CCNC: 79 UNIT/L (ref 40–150)
ALT SERPL-CCNC: 19 UNIT/L (ref 0–55)
AMPHET UR QL SCN: NEGATIVE
ANION GAP SERPL CALC-SCNC: 8 MEQ/L
AST SERPL-CCNC: 18 UNIT/L (ref 5–34)
B-HCG UR QL: NEGATIVE
BACTERIA #/AREA URNS AUTO: ABNORMAL /HPF
BARBITURATE SCN PRESENT UR: NEGATIVE
BASOPHILS # BLD AUTO: 0.02 X10(3)/MCL
BASOPHILS NFR BLD AUTO: 0.3 %
BENZODIAZ UR QL SCN: NEGATIVE
BILIRUB SERPL-MCNC: 0.3 MG/DL
BILIRUB UR QL STRIP.AUTO: NEGATIVE
BUN SERPL-MCNC: 13.7 MG/DL (ref 7–18.7)
CALCIUM SERPL-MCNC: 9 MG/DL (ref 8.4–10.2)
CANNABINOIDS UR QL SCN: NEGATIVE
CHLORIDE SERPL-SCNC: 107 MMOL/L (ref 98–107)
CLARITY UR: CLEAR
CO2 SERPL-SCNC: 25 MMOL/L (ref 22–29)
COCAINE UR QL SCN: NEGATIVE
COLOR UR AUTO: ABNORMAL
CREAT SERPL-MCNC: 0.95 MG/DL (ref 0.55–1.02)
CREAT/UREA NIT SERPL: 14
EOSINOPHIL # BLD AUTO: 0.26 X10(3)/MCL (ref 0–0.9)
EOSINOPHIL NFR BLD AUTO: 4.1 %
ERYTHROCYTE [DISTWIDTH] IN BLOOD BY AUTOMATED COUNT: 14.8 % (ref 11.5–17)
ETHANOL SERPL-MCNC: <10 MG/DL
FENTANYL UR QL SCN: NEGATIVE
GFR SERPLBLD CREATININE-BSD FMLA CKD-EPI: >60 ML/MIN/1.73/M2
GLOBULIN SER-MCNC: 4.1 GM/DL (ref 2.4–3.5)
GLUCOSE SERPL-MCNC: 93 MG/DL (ref 74–100)
GLUCOSE UR QL STRIP: NORMAL
HCT VFR BLD AUTO: 33 % (ref 37–47)
HGB BLD-MCNC: 10.2 G/DL (ref 12–16)
HGB UR QL STRIP: ABNORMAL
IMM GRANULOCYTES # BLD AUTO: 0.01 X10(3)/MCL (ref 0–0.04)
IMM GRANULOCYTES NFR BLD AUTO: 0.2 %
KETONES UR QL STRIP: NEGATIVE
LEUKOCYTE ESTERASE UR QL STRIP: NEGATIVE
LYMPHOCYTES # BLD AUTO: 1.85 X10(3)/MCL (ref 0.6–4.6)
LYMPHOCYTES NFR BLD AUTO: 28.9 %
MAGNESIUM SERPL-MCNC: 1.6 MG/DL (ref 1.6–2.6)
MCH RBC QN AUTO: 24.5 PG (ref 27–31)
MCHC RBC AUTO-ENTMCNC: 30.9 G/DL (ref 33–36)
MCV RBC AUTO: 79.3 FL (ref 80–94)
MDMA UR QL SCN: NEGATIVE
MONOCYTES # BLD AUTO: 0.41 X10(3)/MCL (ref 0.1–1.3)
MONOCYTES NFR BLD AUTO: 6.4 %
MUCOUS THREADS URNS QL MICRO: ABNORMAL /LPF
NEUTROPHILS # BLD AUTO: 3.85 X10(3)/MCL (ref 2.1–9.2)
NEUTROPHILS NFR BLD AUTO: 60.1 %
NITRITE UR QL STRIP: NEGATIVE
NRBC BLD AUTO-RTO: 0 %
OPIATES UR QL SCN: NEGATIVE
PCP UR QL: NEGATIVE
PH UR STRIP: 6.5 [PH]
PH UR: 6.5 [PH] (ref 3–11)
PLATELET # BLD AUTO: 265 X10(3)/MCL (ref 130–400)
PMV BLD AUTO: 10.3 FL (ref 7.4–10.4)
POTASSIUM SERPL-SCNC: 3.8 MMOL/L (ref 3.5–5.1)
PROT SERPL-MCNC: 7.6 GM/DL (ref 6.4–8.3)
PROT UR QL STRIP: NEGATIVE
RBC # BLD AUTO: 4.16 X10(6)/MCL (ref 4.2–5.4)
RBC #/AREA URNS AUTO: ABNORMAL /HPF
SODIUM SERPL-SCNC: 140 MMOL/L (ref 136–145)
SP GR UR STRIP.AUTO: 1.02 (ref 1–1.03)
SPECIFIC GRAVITY, URINE AUTO (.000) (OHS): 1.02 (ref 1–1.03)
SQUAMOUS #/AREA URNS LPF: ABNORMAL /HPF
UROBILINOGEN UR STRIP-ACNC: NORMAL
WBC # BLD AUTO: 6.4 X10(3)/MCL (ref 4.5–11.5)
WBC #/AREA URNS AUTO: ABNORMAL /HPF

## 2024-10-02 PROCEDURE — 80053 COMPREHEN METABOLIC PANEL: CPT | Performed by: EMERGENCY MEDICINE

## 2024-10-02 PROCEDURE — 81001 URINALYSIS AUTO W/SCOPE: CPT | Performed by: EMERGENCY MEDICINE

## 2024-10-02 PROCEDURE — 99285 EMERGENCY DEPT VISIT HI MDM: CPT | Mod: 25

## 2024-10-02 PROCEDURE — 81025 URINE PREGNANCY TEST: CPT | Performed by: EMERGENCY MEDICINE

## 2024-10-02 PROCEDURE — 82077 ASSAY SPEC XCP UR&BREATH IA: CPT | Performed by: EMERGENCY MEDICINE

## 2024-10-02 PROCEDURE — 96375 TX/PRO/DX INJ NEW DRUG ADDON: CPT

## 2024-10-02 PROCEDURE — 96374 THER/PROPH/DIAG INJ IV PUSH: CPT

## 2024-10-02 PROCEDURE — 80307 DRUG TEST PRSMV CHEM ANLYZR: CPT | Performed by: EMERGENCY MEDICINE

## 2024-10-02 PROCEDURE — 83735 ASSAY OF MAGNESIUM: CPT | Performed by: EMERGENCY MEDICINE

## 2024-10-02 PROCEDURE — 85025 COMPLETE CBC W/AUTO DIFF WBC: CPT | Performed by: EMERGENCY MEDICINE

## 2024-10-02 PROCEDURE — 63600175 PHARM REV CODE 636 W HCPCS: Performed by: EMERGENCY MEDICINE

## 2024-10-02 RX ORDER — KETOROLAC TROMETHAMINE 30 MG/ML
15 INJECTION, SOLUTION INTRAMUSCULAR; INTRAVENOUS
Status: COMPLETED | OUTPATIENT
Start: 2024-10-02 | End: 2024-10-02

## 2024-10-02 RX ORDER — CYCLOBENZAPRINE HCL 5 MG
5 TABLET ORAL 3 TIMES DAILY PRN
Qty: 18 TABLET | Refills: 0 | OUTPATIENT
Start: 2024-10-02 | End: 2024-10-09

## 2024-10-02 RX ORDER — DIPHENHYDRAMINE HYDROCHLORIDE 50 MG/ML
25 INJECTION INTRAMUSCULAR; INTRAVENOUS
Status: COMPLETED | OUTPATIENT
Start: 2024-10-02 | End: 2024-10-02

## 2024-10-02 RX ORDER — PROCHLORPERAZINE EDISYLATE 5 MG/ML
10 INJECTION INTRAMUSCULAR; INTRAVENOUS
Status: COMPLETED | OUTPATIENT
Start: 2024-10-02 | End: 2024-10-02

## 2024-10-02 RX ORDER — IBUPROFEN 600 MG/1
600 TABLET ORAL EVERY 6 HOURS PRN
Qty: 20 TABLET | Refills: 0 | Status: SHIPPED | OUTPATIENT
Start: 2024-10-02 | End: 2024-10-09

## 2024-10-02 RX ADMIN — KETOROLAC TROMETHAMINE 15 MG: 30 INJECTION, SOLUTION INTRAMUSCULAR at 10:10

## 2024-10-02 RX ADMIN — DIPHENHYDRAMINE HYDROCHLORIDE 25 MG: 50 INJECTION INTRAMUSCULAR; INTRAVENOUS at 10:10

## 2024-10-02 RX ADMIN — PROCHLORPERAZINE EDISYLATE 10 MG: 5 INJECTION INTRAMUSCULAR; INTRAVENOUS at 10:10

## 2024-10-03 VITALS
SYSTOLIC BLOOD PRESSURE: 139 MMHG | HEART RATE: 74 BPM | DIASTOLIC BLOOD PRESSURE: 107 MMHG | RESPIRATION RATE: 13 BRPM | OXYGEN SATURATION: 99 %

## 2024-10-03 NOTE — ED PROVIDER NOTES
"Encounter Date: 10/2/2024    SCRIBE #1 NOTE: I, Allison Madison, am scribing for, and in the presence of,  Nathan Rich MD. I have scribed the following portions of the note - Other sections scribed: HPI, ROS, PE.       History     Chief Complaint   Patient presents with    Numbness     Arrives [facility] unit [facility] reports hx cva 8 months ago - pt reports r sided numbness for 2 days - denies residual from previous stroke, states that for past few days has been w/ increased fluid retention and pain r knee, family called [facility] for slurred speech and r sided weakness, no r sided drift at this time, speech slowed     The patient is a 28 year old female with a history of CVA and migraines presenting to the ED via EMS for right-sided numbness for 2 days. The patient complains of right knee pain, bilateral leg swelling, and that she "feels swollen". The patient's family called EMS for slurred speech and right-sided weakness. The patient reports that she had a stroke 8 months ago, but denies any residual deficits. The patient notes that she has been stressed recently, and that she was seeing a psychiatrist, but stopped going to appointments because of lack of time due to her work schedule. The patient reports occasional EtOH, but denies any EtOH use today. She notes that migraines have caused episodes similar to this one in the past. The patient reports that she is currently taking labetalol.     The history is provided by the patient. No  was used.     Review of patient's allergies indicates:   Allergen Reactions    Sumatriptan Nausea And Vomiting     No past medical history on file.  No past surgical history on file.  No family history on file.     Review of Systems   Constitutional:  Negative for chills and fever.   Respiratory:  Negative for cough and shortness of breath.    Cardiovascular:  Positive for leg swelling. Negative for chest pain.   Gastrointestinal:  Negative for abdominal pain, nausea and " vomiting.   Musculoskeletal:  Negative for myalgias.   Neurological:  Positive for speech difficulty, weakness and numbness. Negative for syncope and headaches.   All other systems reviewed and are negative.      Physical Exam     Initial Vitals   BP Pulse Resp Temp SpO2   10/02/24 2059 10/02/24 2029 10/02/24 2029 -- 10/02/24 2029   (!) 151/95 91 (!) 23  100 %      MAP       --                Physical Exam    Nursing note and vitals reviewed.  Constitutional: She appears well-developed and well-nourished. No distress.   HENT:   Head: Normocephalic and atraumatic.   Eyes: Conjunctivae are normal.   Cardiovascular:  Normal rate and intact distal pulses.           Pulmonary/Chest: No respiratory distress. She has no rhonchi.   Abdominal: Abdomen is soft. Bowel sounds are normal. There is no abdominal tenderness. There is no rebound and no guarding.   Musculoskeletal:         General: No edema.     Neurological: She is alert. She has normal strength.    V1, V2, and V3 decreased sensation to the right side of the face; equal strength with simultaneous testing to the upper and lower extremities.    Skin: Skin is warm and dry.   Psychiatric: She has a normal mood and affect.         ED Course   Procedures  Labs Reviewed   COMPREHENSIVE METABOLIC PANEL - Abnormal       Result Value    Sodium 140      Potassium 3.8      Chloride 107      CO2 25      Glucose 93      Blood Urea Nitrogen 13.7      Creatinine 0.95      Calcium 9.0      Protein Total 7.6      Albumin 3.5      Globulin 4.1 (*)     Albumin/Globulin Ratio 0.9 (*)     Bilirubin Total 0.3      ALP 79      ALT 19      AST 18      eGFR >60      Anion Gap 8.0      BUN/Creatinine Ratio 14     URINALYSIS, REFLEX TO URINE CULTURE - Abnormal    Color, UA Light-Yellow      Appearance, UA Clear      Specific Gravity, UA 1.017      pH, UA 6.5      Protein, UA Negative      Glucose, UA Normal      Ketones, UA Negative      Blood, UA 3+ (*)     Bilirubin, UA Negative       Urobilinogen, UA Normal      Nitrites, UA Negative      Leukocyte Esterase, UA Negative      RBC, UA 0-5      WBC, UA 0-5      Bacteria, UA Occasional (*)     Squamous Epithelial Cells, UA Trace      Mucous, UA Trace (*)    CBC WITH DIFFERENTIAL - Abnormal    WBC 6.40      RBC 4.16 (*)     Hgb 10.2 (*)     Hct 33.0 (*)     MCV 79.3 (*)     MCH 24.5 (*)     MCHC 30.9 (*)     RDW 14.8      Platelet 265      MPV 10.3      Neut % 60.1      Lymph % 28.9      Mono % 6.4      Eos % 4.1      Basophil % 0.3      Lymph # 1.85      Neut # 3.85      Mono # 0.41      Eos # 0.26      Baso # 0.02      IG# 0.01      IG% 0.2      NRBC% 0.0     ALCOHOL,MEDICAL (ETHANOL) - Normal    Ethanol Level <10.0     DRUG SCREEN, URINE (BEAKER) - Normal    Amphetamines, Urine Negative      Barbiturates, Urine Negative      Benzodiazepine, Urine Negative      Cannabinoids, Urine Negative      Cocaine, Urine Negative      Fentanyl, Urine Negative      MDMA, Urine Negative      Opiates, Urine Negative      Phencyclidine, Urine Negative      pH, Urine 6.5      Specific Gravity, Urine Auto 1.017      Narrative:     Cut off concentrations:    Amphetamines - 1000 ng/ml  Barbiturates - 200 ng/ml  Benzodiazepine - 200 ng/ml  Cannabinoids (THC) - 50 ng/ml  Cocaine - 300 ng/ml  Fentanyl - 1.0 ng/ml  MDMA - 500 ng/ml  Opiates - 300 ng/ml   Phencyclidine (PCP) - 25 ng/ml    Specimen submitted for drug analysis and tested for pH and specific gravity in order to evaluate sample integrity. Suspect tampering if specific gravity is <1.003 and/or pH is not within the range of 4.5 - 8.0  False negatives may result form substances such as bleach added to urine.  False positives may result for the presence of a substance with similar chemical structure to the drug or its metabolite.    This test provides only a PRELIMINARY analytical test result. A more specific alternate chemical method must be used in order to obtain a confirmed analytical result. Gas  chromatography/mass spectrometry (GC/MS) is the preferred confirmatory method. Other chemical confirmation methods are available. Clinical consideration and professional judgement should be applied to any drug of abuse test result, particularly when preliminary positive results are used.    Positive results will be confirmed only at the physicians request. Unconfirmed screening results are to be used only for medical purposes (treatment).        MAGNESIUM - Normal    Magnesium Level 1.60     PREGNANCY TEST, URINE RAPID - Normal    hCG Qualitative, Urine Negative     CBC W/ AUTO DIFFERENTIAL    Narrative:     The following orders were created for panel order CBC auto differential.  Procedure                               Abnormality         Status                     ---------                               -----------         ------                     CBC with Differential[[id number]]       Abnormal            Final result                 Please view results for these tests on the individual orders.          Imaging Results              CT Head Without Contrast (Final result)  Result time 10/02/24 21:14:21      Final result by Chris Saez MD (10/02/24 21:14:21)                   Impression:      No acute intracranial findings identified.      Electronically signed by: Chris Saez  Date:    10/02/2024  Time:    21:14               Narrative:    EXAMINATION:  CT HEAD WITHOUT CONTRAST    CLINICAL HISTORY:  paresthesias;    TECHNIQUE:  Sequential axial images were performed of the brain without contrast.    Dose product length of 952 mGycm. Automated exposure control was utilized to minimize radiation dose.    COMPARISON:  CT brain February 9, 2024.  MRI brain February 9, 2024.    FINDINGS:  Gray white matter differentiation is unremarkable.  There is no intracranial mass effect, midline shift, hydrocephalus or hemorrhage. There is no sulcal effacement or low attenuation changes to suggest recent large vessel  territory infarction.  There is no acute extra axial fluid collection. Visualized paranasal sinuses are clear without mucosal thickening, polypoidal abnormality or air-fluid levels. Mastoid air cells aeration is optimal.                                       Medications   ketorolac injection 15 mg (15 mg Intravenous Given 10/2/24 2226)   prochlorperazine injection Soln 10 mg (10 mg Intravenous Given 10/2/24 2227)   diphenhydrAMINE injection 25 mg (25 mg Intravenous Given 10/2/24 2227)     Medical Decision Making  The differential diagnosis includes, but is not limited to, CVA, conventional disorder, anxiety, and complex migraines.       Amount and/or Complexity of Data Reviewed  Labs: ordered.  Radiology: ordered.    Risk  Prescription drug management.            Scribe Attestation:   Scribe #1: I performed the above scribed service and the documentation accurately describes the services I performed. I attest to the accuracy of the note.    Attending Attestation:           Physician Attestation for Scribe:  Physician Attestation Statement for Scribe #1: I, Nathan Rich MD, reviewed documentation, as scribed by Allison Madison in my presence, and it is both accurate and complete.             ED Course as of 10/03/24 0029   Wed Oct 02, 2024   2138 Patient reports she has a history of migraines that occasionally caused her to have weakness and numbness on her right side.  She has been having some headaches unless day or 2.  The decreased sensation started 2 days ago felt beat today.  She was no objective weakness on exam she does report decreased light touch sensation in the right side of the face in V1 2 and 3 distribution not consistent with CVA.  Patient was similar presentation and admission in February with no signs of stroke.  She states she did follow up with a psychologist afterwards briefly but stopped when she started going to work.  I recommend she consider resuming that relationship she reports she has  "been under lot of stress at home recently [LF]   2140 Reviewed records previous MRIs showed no evidence of CVA [LF]   u Oct 03, 2024   0009 I reviewed records from previous admission and Neurology evaluation stroke workup was negative they recommended she follow up with General Neurology not stroke Neurology if warranted or psych consult.  Apparently patient was having some stress at home at that time.  She states previously she did follow up with a psychiatrist but has not been following.  States she was not followed up with Neurology as an outpatient.  She states she does get headaches associated with the symptoms off and on complex migraines a consideration.  Currently she states she feels "much better".  Symptoms improved.  Discussed using NSAIDs and muscle relaxers as needed as an outpatient return if symptoms return.  Follow up with Psychiatry and will send a referral for Neurology. [LF]      ED Course User Index  [LF] Nathan Rich MD                           Clinical Impression:  Final diagnoses:  [G43.809] Other migraine without status migrainosus, not intractable (Primary)  [R20.0] Numbness  [R20.2] Paresthesia          ED Disposition Condition    Discharge Stable          ED Prescriptions       Medication Sig Dispense Start Date End Date Auth. Provider    ibuprofen (ADVIL,MOTRIN) 600 MG tablet Take 1 tablet (600 mg total) by mouth every 6 (six) hours as needed for Pain. 20 tablet 10/2/2024 -- Nathan Rich MD    cyclobenzaprine (FLEXERIL) 5 MG tablet Take 1 tablet (5 mg total) by mouth 3 (three) times daily as needed for Muscle spasms. 18 tablet 10/2/2024 10/9/2024 Nathan Rich MD          Follow-up Information       Follow up With Specialties Details Why Contact Info    Wendy Babin MD Internal Medicine   [address] [facility] [address]  [phone]      Arlene Beltran MD Neurology, Psychiatry   [address] [facility] [id number] [phone]             "   Nathan Rich MD  10/03/24 0029

## 2024-10-03 NOTE — DISCHARGE INSTRUCTIONS
Take your medications as prescribed.  Get back in touch with your psych cardiologist's or psychiatrist.  If you develop headaches use the medication I have prescribed you.  If your symptoms change or worsen please return to the emergency department

## 2024-10-09 ENCOUNTER — HOSPITAL ENCOUNTER (EMERGENCY)
Facility: HOSPITAL | Age: 29
Discharge: HOME | End: 2024-10-09
Attending: EMERGENCY MEDICINE
Payer: MEDICAID

## 2024-10-09 VITALS
WEIGHT: 290 LBS | BODY MASS INDEX: 43.95 KG/M2 | HEIGHT: 68 IN | TEMPERATURE: 98 F | DIASTOLIC BLOOD PRESSURE: 104 MMHG | OXYGEN SATURATION: 100 % | SYSTOLIC BLOOD PRESSURE: 148 MMHG | HEART RATE: 93 BPM | RESPIRATION RATE: 20 BRPM

## 2024-10-09 DIAGNOSIS — M62.838 NECK MUSCLE SPASM: ICD-10-CM

## 2024-10-09 DIAGNOSIS — M79.604 ACUTE PAIN OF RIGHT LOWER EXTREMITY: ICD-10-CM

## 2024-10-09 DIAGNOSIS — M79.89 RIGHT LEG SWELLING: ICD-10-CM

## 2024-10-09 DIAGNOSIS — M79.601 RIGHT ARM PAIN: ICD-10-CM

## 2024-10-09 LAB
ALBUMIN SERPL-MCNC: 4 G/DL (ref 3.5–5)
ALBUMIN/GLOB SERPL: 0.8 RATIO (ref 1.1–2)
ALP SERPL-CCNC: 91 UNIT/L (ref 40–150)
ALT SERPL-CCNC: 24 UNIT/L (ref 0–55)
ANION GAP SERPL CALC-SCNC: 13 MEQ/L
AST SERPL-CCNC: 48 UNIT/L (ref 5–34)
B-HCG UR QL: NEGATIVE
BACTERIA #/AREA URNS AUTO: NORMAL /HPF
BASOPHILS # BLD AUTO: 0.03 X10(3)/MCL
BASOPHILS NFR BLD AUTO: 0.4 %
BILIRUB SERPL-MCNC: 1.1 MG/DL
BILIRUB UR QL STRIP.AUTO: NEGATIVE
BNP BLD-MCNC: <10 PG/ML
BUN SERPL-MCNC: 11.8 MG/DL (ref 7–18.7)
CALCIUM SERPL-MCNC: 9.4 MG/DL (ref 8.4–10.2)
CHLORIDE SERPL-SCNC: 104 MMOL/L (ref 98–107)
CLARITY UR: CLEAR
CO2 SERPL-SCNC: 22 MMOL/L (ref 22–29)
COLOR UR AUTO: YELLOW
CREAT SERPL-MCNC: 1.04 MG/DL (ref 0.55–1.02)
CREAT/UREA NIT SERPL: 11
EOSINOPHIL # BLD AUTO: 0.32 X10(3)/MCL (ref 0–0.9)
EOSINOPHIL NFR BLD AUTO: 4 %
ERYTHROCYTE [DISTWIDTH] IN BLOOD BY AUTOMATED COUNT: 15 % (ref 11.5–17)
GFR SERPLBLD CREATININE-BSD FMLA CKD-EPI: >60 ML/MIN/1.73/M2
GLOBULIN SER-MCNC: 4.8 GM/DL (ref 2.4–3.5)
GLUCOSE SERPL-MCNC: 87 MG/DL (ref 74–100)
GLUCOSE UR QL STRIP: NEGATIVE
HCT VFR BLD AUTO: 33.3 % (ref 37–47)
HGB BLD-MCNC: 10.7 G/DL (ref 12–16)
HGB UR QL STRIP: NEGATIVE
IMM GRANULOCYTES # BLD AUTO: 0.02 X10(3)/MCL (ref 0–0.04)
IMM GRANULOCYTES NFR BLD AUTO: 0.2 %
KETONES UR QL STRIP: NEGATIVE
LEUKOCYTE ESTERASE UR QL STRIP: NEGATIVE
LYMPHOCYTES # BLD AUTO: 1.89 X10(3)/MCL (ref 0.6–4.6)
LYMPHOCYTES NFR BLD AUTO: 23.4 %
MCH RBC QN AUTO: 24.3 PG (ref 27–31)
MCHC RBC AUTO-ENTMCNC: 32.1 G/DL (ref 33–36)
MCV RBC AUTO: 75.5 FL (ref 80–94)
MONOCYTES # BLD AUTO: 0.68 X10(3)/MCL (ref 0.1–1.3)
MONOCYTES NFR BLD AUTO: 8.4 %
NEUTROPHILS # BLD AUTO: 5.15 X10(3)/MCL (ref 2.1–9.2)
NEUTROPHILS NFR BLD AUTO: 63.6 %
NITRITE UR QL STRIP: NEGATIVE
NRBC BLD AUTO-RTO: 0 %
PH UR STRIP: 6 [PH]
PLATELET # BLD AUTO: 278 X10(3)/MCL (ref 130–400)
PMV BLD AUTO: 9.8 FL (ref 7.4–10.4)
POTASSIUM SERPL-SCNC: 4.9 MMOL/L (ref 3.5–5.1)
PROT SERPL-MCNC: 8.8 GM/DL (ref 6.4–8.3)
PROT UR QL STRIP: NEGATIVE
RBC # BLD AUTO: 4.41 X10(6)/MCL (ref 4.2–5.4)
RBC #/AREA URNS AUTO: NORMAL /HPF
SODIUM SERPL-SCNC: 139 MMOL/L (ref 136–145)
SP GR UR STRIP.AUTO: 1.02 (ref 1–1.03)
SQUAMOUS #/AREA URNS LPF: NORMAL /HPF
UROBILINOGEN UR STRIP-ACNC: 0.2
WBC # BLD AUTO: 8.09 X10(3)/MCL (ref 4.5–11.5)
WBC #/AREA URNS AUTO: NORMAL /HPF

## 2024-10-09 PROCEDURE — 85025 COMPLETE CBC W/AUTO DIFF WBC: CPT

## 2024-10-09 PROCEDURE — 80053 COMPREHEN METABOLIC PANEL: CPT

## 2024-10-09 PROCEDURE — 81001 URINALYSIS AUTO W/SCOPE: CPT

## 2024-10-09 PROCEDURE — 25000003 PHARM REV CODE 250: Performed by: EMERGENCY MEDICINE

## 2024-10-09 PROCEDURE — 81025 URINE PREGNANCY TEST: CPT

## 2024-10-09 PROCEDURE — 99284 EMERGENCY DEPT VISIT MOD MDM: CPT | Mod: 25

## 2024-10-09 PROCEDURE — 83880 ASSAY OF NATRIURETIC PEPTIDE: CPT

## 2024-10-09 RX ORDER — IBUPROFEN 600 MG/1
600 TABLET ORAL EVERY 6 HOURS PRN
Qty: 20 TABLET | Refills: 0 | Status: SHIPPED | OUTPATIENT
Start: 2024-10-09

## 2024-10-09 RX ORDER — MELOXICAM 7.5 MG/1
15 TABLET ORAL
Status: COMPLETED | OUTPATIENT
Start: 2024-10-09 | End: 2024-10-09

## 2024-10-09 RX ORDER — METHOCARBAMOL 500 MG/1
1000 TABLET, FILM COATED ORAL 3 TIMES DAILY
Qty: 30 TABLET | Refills: 0 | Status: SHIPPED | OUTPATIENT
Start: 2024-10-09 | End: 2024-10-14

## 2024-10-09 RX ORDER — METHOCARBAMOL 500 MG/1
1000 TABLET, FILM COATED ORAL
Status: COMPLETED | OUTPATIENT
Start: 2024-10-09 | End: 2024-10-09

## 2024-10-09 RX ADMIN — METHOCARBAMOL 1000 MG: 500 TABLET ORAL at 02:10

## 2024-10-09 RX ADMIN — MELOXICAM 15 MG: 7.5 TABLET ORAL at 02:10

## 2024-10-09 NOTE — ED PROVIDER NOTES
Encounter Date: 10/9/2024       History     Chief Complaint   Patient presents with    Leg Swelling     Pt to ed via pov with reports pain and swelling to RLE. Also c/o RUE numbness and tingling x1 week. Onset 9/30. C/o pain to R neck radiating down R arm that began this morning. Denies injury/trauma. GCS 15. Seen last week for same complaints.      29-year-old female presents to the emergency department for evaluation of right arm and leg numbness and tingling for the last 1 week with the accompanying pain starting this morning.  Also complained of right leg swelling; however, not appreciable on examination.  Denies any injury.  Reports history of previous CVA and states these symptoms flare up from time to time.  Denies any new weakness.    The history is provided by the patient and medical records.     Review of patient's allergies indicates:   Allergen Reactions    Sumatriptan Nausea And Vomiting     Past Medical History:   Diagnosis Date    Hypertension     Stroke      No past surgical history on file.  No family history on file.     Review of Systems   Constitutional:  Negative for fever.   Musculoskeletal:  Positive for neck pain.   Neurological:  Positive for numbness. Negative for weakness.       Physical Exam     Initial Vitals [10/09/24 1044]   BP Pulse Resp Temp SpO2   (!) 154/97 109 20 98.1 °F (36.7 °C) 100 %      MAP       --         Physical Exam    Nursing note and vitals reviewed.  Constitutional: She appears well-developed and well-nourished. No distress.   HENT:   Head: Normocephalic and atraumatic. Mouth/Throat: Oropharynx is clear and moist.   Eyes: Conjunctivae are normal. Pupils are equal, round, and reactive to light.   Neck: Neck supple.   Normal range of motion.  Cardiovascular:  Normal rate, regular rhythm and intact distal pulses.           Pulmonary/Chest: Breath sounds normal. No respiratory distress.   Abdominal: Abdomen is soft. She exhibits no distension. There is no abdominal  tenderness.   Musculoskeletal:         General: Normal range of motion.      Cervical back: Normal range of motion and neck supple.      Comments: No cervical, thoracic, or lumbar tenderness to palpation, no step-off deformity     Neurological: She is alert and oriented to person, place, and time. She has normal strength. No cranial nerve deficit or sensory deficit. GCS score is 15. GCS eye subscore is 4. GCS verbal subscore is 5. GCS motor subscore is 6.   Negative straight leg raise bilaterally   Skin: Skin is warm and dry. No rash noted.   Psychiatric: She has a normal mood and affect.         ED Course   Procedures  Labs Reviewed   COMPREHENSIVE METABOLIC PANEL - Abnormal       Result Value    Sodium 139      Potassium 4.9      Chloride 104      CO2 22      Glucose 87      Blood Urea Nitrogen 11.8      Creatinine 1.04 (*)     Calcium 9.4      Protein Total 8.8 (*)     Albumin 4.0      Globulin 4.8 (*)     Albumin/Globulin Ratio 0.8 (*)     Bilirubin Total 1.1      ALP 91      ALT 24      AST 48 (*)     eGFR >60      Anion Gap 13.0      BUN/Creatinine Ratio 11     CBC WITH DIFFERENTIAL - Abnormal    WBC 8.09      RBC 4.41      Hgb 10.7 (*)     Hct 33.3 (*)     MCV 75.5 (*)     MCH 24.3 (*)     MCHC 32.1 (*)     RDW 15.0      Platelet 278      MPV 9.8      Neut % 63.6      Lymph % 23.4      Mono % 8.4      Eos % 4.0      Basophil % 0.4      Lymph # 1.89      Neut # 5.15      Mono # 0.68      Eos # 0.32      Baso # 0.03      IG# 0.02      IG% 0.2      NRBC% 0.0     PREGNANCY TEST, URINE RAPID - Normal    hCG Qualitative, Urine Negative     B-TYPE NATRIURETIC PEPTIDE - Normal    Natriuretic Peptide <10.0     CBC W/ AUTO DIFFERENTIAL    Narrative:     The following orders were created for panel order CBC Auto Differential.  Procedure                               Abnormality         Status                     ---------                               -----------         ------                     CBC with  Differential[[id number]]       Abnormal            Final result                 Please view results for these tests on the individual orders.   URINALYSIS, REFLEX TO URINE CULTURE    Color, UA Yellow      Appearance, UA Clear      Specific Gravity, UA 1.025      pH, UA 6.0      Protein, UA Negative      Glucose, UA Negative      Ketones, UA Negative      Blood, UA Negative      Bilirubin, UA Negative      Urobilinogen, UA 0.2      Nitrites, UA Negative      Leukocyte Esterase, UA Negative      RBC, UA None Seen      WBC, UA 0-2      Bacteria, UA None Seen      Squamous Epithelial Cells, UA 5-10            Imaging Results              CT Cervical Spine Without Contrast (Final result)  Result time 10/09/24 14:48:19      Final result by Jitendra Connolly MD (10/09/24 14:48:19)                   Impression:      Loss of the normal lordotic curve of the cervical spine most likely related to spasm but otherwise unremarkable with no evidence of acute fracture or dislocation seen      Electronically signed by: Kain Connolly  Date:    10/09/2024  Time:    14:48               Narrative:    EXAMINATION:  CT CERVICAL SPINE WITHOUT CONTRAST    CLINICAL HISTORY:  Cervical radiculopathy, no red flags;    TECHNIQUE:  Low dose axial images, sagittal and coronal reformations were performed though the cervical spine.  Contrast was not administered. Automatic exposure control is utilized to reduce patient radiation exposure.    COMPARISON:  None    FINDINGS:  The vertebral body heights are well maintained. There is some loss of the normal lordotic curve cervical spine most likely related to spasm. No fracture is seen. No dislocation is seen. The odontoid and lateral masses appear grossly unremarkable.                                  CV US RLE: The right lower extremity venous system is patent with no evidence of superficial or deep vein thrombosis.        Medications   methocarbamoL tablet 1,000 mg (1,000 mg Oral Given 10/9/24  1426)   meloxicam tablet 15 mg (15 mg Oral Given 10/9/24 1426)     Medical Decision Making    ED assessment:    Ms. Hale presented for evaluation of right arm and leg numbness, pain which she relates is similar to previous exacerbation since a past stroke.  No appreciable acute neuro deficits on examination today.       Differential diagnosis (including but not limited to):   Muscle pain, cervical radiculopathy, acute kidney injury, electrolyte derangements, somatization, exacerbation of chronic pain    ED management:  ED workup overall reassuring with no significant laboratory abnormalities, CT scan with no acute osseous process, some note made of likely spasm.  After NSAIDs, muscle relaxants, she was resting comfortably, feeling improved and is ready for discharge home.  Discussed gentle range-of-motion exercises/stretches that may help with ongoing pain.  NSAIDs, muscle relaxant prescribed as well.  Over-the-counter topical analgesics may be beneficial as well.  ED return precautions reviewed at the bedside and provided in the written discharge instructions. All questions answered to the best of my ability.     Amount and/or Complexity of Data Reviewed  Independent historian: none   Summary of history:   External data reviewed: notes from previous admissions, notes from previous ED visits, and prior imaging  Summary of data reviewed:  Multiple previous emergency department visits with similar numbness complaints.  Several brain MRIs over the last few years with no acute ischemic findings.      Risk and benefits of testing: discussed   Labs: ordered and reviewed  Radiology: ordered and independent interpretation performed (see above or ED course)      Risk  Prescription drug management   Shared decision making     Critical Care  none    Camille MULTANI MD personally performed the history, PE, MDM, and procedures as documented above and agree with the scribe's documentation.                                      Clinical Impression:  Final diagnoses:  [M79.604] Acute pain of right lower extremity  [M79.89] Right leg swelling  [M79.601] Right arm pain  [M62.838] Neck muscle spasm          ED Disposition Condition    Discharge Stable          ED Prescriptions       Medication Sig Dispense Start Date End Date Auth. Provider    methocarbamoL (ROBAXIN) 500 MG Tab  Take 2 tablets (1,000 mg total) by mouth 3 (three) times daily. for 5 days 30 tablet 10/9/2024 10/14/2024 Camille Donald MD    ibuprofen (ADVIL,MOTRIN) 600 MG tablet Take 1 tablet (600 mg total) by mouth every 6 (six) hours as needed for Pain. 20 tablet 10/9/2024 -- Camille Donald MD          Follow-up Information       Follow up With Specialties Details Why Contact Info    Wendy Babin MD Internal Medicine Schedule an appointment as soon as possible for a visit   [address] [place] [address] [id number] [phone]      [facility] - Emergency Dept Emergency Medicine  As needed, If symptoms worsen [facility] [facility] [id number] [phone]             Camille Donald MD  11/06/24 0881

## 2024-10-09 NOTE — FIRST PROVIDER EVALUATION
"Medical screening examination initiated.  I have conducted a focused provider triage encounter, findings are as follows:    Brief history of present illness:  28 year old female with PMHx CVA presents to the ER for evaluation of right lower extremity pain and swelling x 1 week, worsening today. Patient reports continued right side numbness x 1 week. Patient was seen in the ER on 10/02/24 with negative work up, unchanged symptoms since. PCP, Dr Babin, recommended ER evaluation of DVT     Vitals:    10/09/24 1044   BP: (!) 154/97   Pulse: 109   Resp: 20   Temp: 98.1 °F (36.7 °C)   TempSrc: Oral   SpO2: 100%   Weight: 131.5 kg (290 lb)   Height: 5' 8" (1.727 m)       Pertinent physical exam:  alert, non-labored, ambulatory     Brief workup plan:  labs, urine, US    Preliminary workup initiated; this workup will be continued and followed by the physician or advanced practice provider that is assigned to the patient when roomed.  "

## 2025-06-06 ENCOUNTER — TELEPHONE (OUTPATIENT)
Dept: NEUROLOGY | Facility: CLINIC | Age: 30
End: 2025-06-06
Payer: MEDICAID

## 2025-06-13 PROBLEM — F32.A DEPRESSIVE DISORDER: Status: ACTIVE | Noted: 2019-12-10

## 2025-06-13 PROBLEM — I10 HYPERTENSIVE DISORDER: Status: ACTIVE | Noted: 2019-12-10
